# Patient Record
Sex: MALE | Race: WHITE | Employment: FULL TIME | URBAN - METROPOLITAN AREA
[De-identification: names, ages, dates, MRNs, and addresses within clinical notes are randomized per-mention and may not be internally consistent; named-entity substitution may affect disease eponyms.]

---

## 2017-01-19 ENCOUNTER — OFFICE VISIT (OUTPATIENT)
Dept: INTERNAL MEDICINE CLINIC | Facility: CLINIC | Age: 47
End: 2017-01-19

## 2017-01-19 VITALS
RESPIRATION RATE: 18 BRPM | HEIGHT: 73 IN | WEIGHT: 193.38 LBS | SYSTOLIC BLOOD PRESSURE: 134 MMHG | BODY MASS INDEX: 25.63 KG/M2 | HEART RATE: 76 BPM | DIASTOLIC BLOOD PRESSURE: 84 MMHG

## 2017-01-19 DIAGNOSIS — F41.9 ANXIETY: ICD-10-CM

## 2017-01-19 DIAGNOSIS — F17.200 SMOKER: ICD-10-CM

## 2017-01-19 DIAGNOSIS — D75.1 ERYTHROCYTOSIS: ICD-10-CM

## 2017-01-19 DIAGNOSIS — Z00.00 ROUTINE HEALTH MAINTENANCE: ICD-10-CM

## 2017-01-19 DIAGNOSIS — I10 ESSENTIAL HYPERTENSION WITH GOAL BLOOD PRESSURE LESS THAN 140/90: Primary | ICD-10-CM

## 2017-01-19 PROCEDURE — 99212 OFFICE O/P EST SF 10 MIN: CPT | Performed by: INTERNAL MEDICINE

## 2017-01-19 PROCEDURE — 99214 OFFICE O/P EST MOD 30 MIN: CPT | Performed by: INTERNAL MEDICINE

## 2017-01-19 RX ORDER — ALPRAZOLAM 0.5 MG/1
0.5 TABLET ORAL AS NEEDED
Qty: 30 TABLET | Refills: 1 | Status: SHIPPED | OUTPATIENT
Start: 2017-01-19 | End: 2018-01-09

## 2017-01-19 NOTE — PATIENT INSTRUCTIONS
Start Chantix tomorrow. Quit smoking January 30  Continue Chantix. Stop Chantix and call if any signs of sadness or depression. Call if any other side effects.       Cómo conseguir apoyo para dejar de fumar  Dejar de fumar no tiene por qué ser [employer] productos para dejar de fumar, tales tone medicamentos o parches de nicotina, inhaladores, sprays nasales, chicles o tabletas. Pida ayuda  Algunas veces es posible que necesite simplemente hablar con alguien cuando echa de menos el cigarrillo.  Ritta Hang o

## 2017-01-19 NOTE — PROGRESS NOTES
HPI:    Patient ID: Annette Coleman is a 55year old male. HPI Comments: He is feeling well. There was a problem with his refills. He has cut back from a pack per day to a half pack per day. He is trying to quit smoking.     HTN  This is a chronic probl Continue present management. 2. Erythrocytosis  Stable. F/U with oncology. 3. Smoker   Pt would like Chantix. Advised side effect of Chantix. Pt accepts and understands risks. Advised to quit in 1 week.    He will follow-up 1-2 weeks after quittin

## 2017-02-02 ENCOUNTER — LAB ENCOUNTER (OUTPATIENT)
Dept: LAB | Facility: HOSPITAL | Age: 47
End: 2017-02-02
Attending: INTERNAL MEDICINE
Payer: COMMERCIAL

## 2017-02-02 DIAGNOSIS — Z00.00 ROUTINE HEALTH MAINTENANCE: ICD-10-CM

## 2017-02-02 LAB
ALBUMIN SERPL BCP-MCNC: 4.6 G/DL (ref 3.5–4.8)
ALBUMIN/GLOB SERPL: 1.6 {RATIO} (ref 1–2)
ALP SERPL-CCNC: 66 U/L (ref 32–100)
ALT SERPL-CCNC: 23 U/L (ref 17–63)
ANION GAP SERPL CALC-SCNC: 13 MMOL/L (ref 0–18)
AST SERPL-CCNC: 34 U/L (ref 15–41)
BASOPHILS # BLD: 0.1 K/UL (ref 0–0.2)
BASOPHILS NFR BLD: 1 %
BILIRUB SERPL-MCNC: 1.3 MG/DL (ref 0.3–1.2)
BUN SERPL-MCNC: 11 MG/DL (ref 8–20)
BUN/CREAT SERPL: 10.3 (ref 10–20)
CALCIUM SERPL-MCNC: 10.1 MG/DL (ref 8.5–10.5)
CHLORIDE SERPL-SCNC: 105 MMOL/L (ref 95–110)
CHOLEST SERPL-MCNC: 211 MG/DL (ref 110–200)
CO2 SERPL-SCNC: 26 MMOL/L (ref 22–32)
CREAT SERPL-MCNC: 1.07 MG/DL (ref 0.5–1.5)
EOSINOPHIL # BLD: 0.1 K/UL (ref 0–0.7)
EOSINOPHIL NFR BLD: 1 %
ERYTHROCYTE [DISTWIDTH] IN BLOOD BY AUTOMATED COUNT: 13.2 % (ref 11–15)
GLOBULIN PLAS-MCNC: 2.9 G/DL (ref 2.5–3.7)
GLUCOSE SERPL-MCNC: 88 MG/DL (ref 70–99)
HCT VFR BLD AUTO: 55.3 % (ref 41–52)
HDLC SERPL-MCNC: 59 MG/DL
HGB BLD-MCNC: 18.6 G/DL (ref 13.5–17.5)
LDLC SERPL CALC-MCNC: 138 MG/DL (ref 0–99)
LYMPHOCYTES # BLD: 2.1 K/UL (ref 1–4)
LYMPHOCYTES NFR BLD: 23 %
MCH RBC QN AUTO: 35.1 PG (ref 27–32)
MCHC RBC AUTO-ENTMCNC: 33.7 G/DL (ref 32–37)
MCV RBC AUTO: 104.3 FL (ref 80–100)
MONOCYTES # BLD: 0.8 K/UL (ref 0–1)
MONOCYTES NFR BLD: 8 %
NEUTROPHILS # BLD AUTO: 6.2 K/UL (ref 1.8–7.7)
NEUTROPHILS NFR BLD: 67 %
NONHDLC SERPL-MCNC: 152 MG/DL
OSMOLALITY UR CALC.SUM OF ELEC: 297 MOSM/KG (ref 275–295)
PLATELET # BLD AUTO: 252 K/UL (ref 140–400)
PMV BLD AUTO: 8.4 FL (ref 7.4–10.3)
POTASSIUM SERPL-SCNC: 4.6 MMOL/L (ref 3.3–5.1)
PROT SERPL-MCNC: 7.5 G/DL (ref 5.9–8.4)
PSA SERPL-MCNC: 2.3 NG/ML (ref 0–4)
RBC # BLD AUTO: 5.31 M/UL (ref 4.5–5.9)
SODIUM SERPL-SCNC: 144 MMOL/L (ref 136–144)
TRIGL SERPL-MCNC: 69 MG/DL (ref 1–149)
TSH SERPL-ACNC: 0.94 UIU/ML (ref 0.34–5.6)
WBC # BLD AUTO: 9.3 K/UL (ref 4–11)

## 2017-02-02 PROCEDURE — 36415 COLL VENOUS BLD VENIPUNCTURE: CPT

## 2017-02-02 PROCEDURE — 80061 LIPID PANEL: CPT

## 2017-02-02 PROCEDURE — 84443 ASSAY THYROID STIM HORMONE: CPT

## 2017-02-02 PROCEDURE — 85025 COMPLETE CBC W/AUTO DIFF WBC: CPT

## 2017-02-02 PROCEDURE — 80053 COMPREHEN METABOLIC PANEL: CPT

## 2017-02-07 ENCOUNTER — TELEPHONE (OUTPATIENT)
Dept: INTERNAL MEDICINE CLINIC | Facility: CLINIC | Age: 47
End: 2017-02-07

## 2017-02-07 NOTE — TELEPHONE ENCOUNTER
Pt calling checking status of health screening forms to be filled out and faxed to [phone] [facility]. For pts employer. Pt states fill out all information possible but all does not have to be filled in.  Pt would also like copy of form faxed to

## 2017-02-08 NOTE — TELEPHONE ENCOUNTER
Spoke to patient and he believes he faxed it to the [address] [place] [address] location. Advised patient I will check for his form on this Saturday. Patient voiced his understanding  of this.

## 2017-02-08 NOTE — TELEPHONE ENCOUNTER
Form completed and faxed to Yina Forman. Fax confirmation received Copy faxed to patient's company and fax confirmation received.

## 2017-03-01 ENCOUNTER — TELEPHONE (OUTPATIENT)
Dept: INTERNAL MEDICINE CLINIC | Facility: CLINIC | Age: 47
End: 2017-03-01

## 2017-03-01 DIAGNOSIS — I10 ESSENTIAL HYPERTENSION WITH GOAL BLOOD PRESSURE LESS THAN 140/90: Primary | ICD-10-CM

## 2017-03-04 RX ORDER — LOSARTAN POTASSIUM 50 MG/1
50 TABLET ORAL DAILY
Qty: 90 TABLET | Refills: 0 | Status: SHIPPED | OUTPATIENT
Start: 2017-03-04 | End: 2017-06-01

## 2017-03-04 NOTE — TELEPHONE ENCOUNTER
Hypertensive Medications: Refilled per protocol    Protocol Criteria:  · Appointment scheduled in the past 6 months or in the next 3 months  · BMP or CMP in the past 12 months  · Creatinine result < 2  Recent Visits       Provider Department Primary Dx

## 2017-06-01 ENCOUNTER — TELEPHONE (OUTPATIENT)
Dept: INTERNAL MEDICINE CLINIC | Facility: CLINIC | Age: 47
End: 2017-06-01

## 2017-06-01 DIAGNOSIS — I10 ESSENTIAL HYPERTENSION WITH GOAL BLOOD PRESSURE LESS THAN 140/90: Primary | ICD-10-CM

## 2017-06-01 RX ORDER — LOSARTAN POTASSIUM 50 MG/1
50 TABLET ORAL DAILY
Qty: 90 TABLET | Refills: 0 | Status: SHIPPED | OUTPATIENT
Start: 2017-06-01 | End: 2017-08-24

## 2017-06-01 NOTE — TELEPHONE ENCOUNTER
Pt called in requesting a refill on his Losartan Potassium, please. Pt states he took his last tablet today. Current outpatient prescriptions:   •  Losartan Potassium 50 MG Oral Tab, Take 1 tablet (50 mg total) by mouth daily. , Disp: 90 tablet, Rfl: 0

## 2017-06-02 NOTE — TELEPHONE ENCOUNTER
Spoke to pt to inform rx was approved and sent to pharmacy. Per pt already picked up prescription and is back on medication. No further requests or questions at this time.

## 2017-08-24 DIAGNOSIS — I10 ESSENTIAL HYPERTENSION WITH GOAL BLOOD PRESSURE LESS THAN 140/90: ICD-10-CM

## 2017-08-27 RX ORDER — LOSARTAN POTASSIUM 50 MG/1
TABLET ORAL
Qty: 30 TABLET | Refills: 0 | Status: SHIPPED | OUTPATIENT
Start: 2017-08-27 | End: 2017-09-24

## 2017-09-24 DIAGNOSIS — I10 ESSENTIAL HYPERTENSION WITH GOAL BLOOD PRESSURE LESS THAN 140/90: ICD-10-CM

## 2017-09-28 RX ORDER — LOSARTAN POTASSIUM 50 MG/1
TABLET ORAL
Qty: 30 TABLET | Refills: 0 | Status: SHIPPED | OUTPATIENT
Start: 2017-09-28 | End: 2017-10-25

## 2017-10-25 DIAGNOSIS — I10 ESSENTIAL HYPERTENSION WITH GOAL BLOOD PRESSURE LESS THAN 140/90: ICD-10-CM

## 2017-10-26 RX ORDER — LOSARTAN POTASSIUM 50 MG/1
TABLET ORAL
Qty: 30 TABLET | Refills: 0 | Status: SHIPPED | OUTPATIENT
Start: 2017-10-26 | End: 2017-11-25

## 2017-10-26 NOTE — TELEPHONE ENCOUNTER
Please advise on refill request.   Hypertensive Medications  Protocol Criteria:  · Appointment scheduled in the past 6 months or in the next 3 months  · BMP or CMP in the past 12 months  · Creatinine result < 2  Recent Outpatient Visits            9 months

## 2017-11-25 DIAGNOSIS — I10 ESSENTIAL HYPERTENSION WITH GOAL BLOOD PRESSURE LESS THAN 140/90: ICD-10-CM

## 2017-11-25 RX ORDER — LOSARTAN POTASSIUM 50 MG/1
TABLET ORAL
Qty: 30 TABLET | Refills: 0 | Status: SHIPPED | OUTPATIENT
Start: 2017-11-25 | End: 2017-12-23

## 2017-12-23 DIAGNOSIS — I10 ESSENTIAL HYPERTENSION WITH GOAL BLOOD PRESSURE LESS THAN 140/90: ICD-10-CM

## 2017-12-26 RX ORDER — LOSARTAN POTASSIUM 50 MG/1
TABLET ORAL
Qty: 30 TABLET | Refills: 0 | Status: SHIPPED | OUTPATIENT
Start: 2017-12-26 | End: 2018-01-09

## 2017-12-26 NOTE — TELEPHONE ENCOUNTER
Please advise on refill request.    Please reply to pool: SARAH Verdugo please schedule patient with Dr Mina Mason    Hypertensive Medications  Protocol Criteria:  · Appointment scheduled in the past 6 months or in the next 3 months  · BMP or CMP in the past

## 2017-12-26 NOTE — TELEPHONE ENCOUNTER
Pt has 4 pills left and is scheduled to come and see DR on 1/9. pls advise on refill. Thank you      Current Outpatient Prescriptions:  Losartan Potassium 50 MG Oral Tab PLEASE SCHEDULE AN APPOINTMENT. 1 daily.  Disp: 30 tablet Rfl: 0

## 2018-01-09 ENCOUNTER — LAB ENCOUNTER (OUTPATIENT)
Dept: LAB | Facility: HOSPITAL | Age: 48
End: 2018-01-09
Attending: INTERNAL MEDICINE
Payer: COMMERCIAL

## 2018-01-09 ENCOUNTER — OFFICE VISIT (OUTPATIENT)
Dept: INTERNAL MEDICINE CLINIC | Facility: CLINIC | Age: 48
End: 2018-01-09

## 2018-01-09 VITALS
HEIGHT: 73 IN | SYSTOLIC BLOOD PRESSURE: 131 MMHG | WEIGHT: 202 LBS | HEART RATE: 80 BPM | BODY MASS INDEX: 26.77 KG/M2 | DIASTOLIC BLOOD PRESSURE: 85 MMHG

## 2018-01-09 DIAGNOSIS — Z00.00 ROUTINE HEALTH MAINTENANCE: ICD-10-CM

## 2018-01-09 DIAGNOSIS — I10 ESSENTIAL HYPERTENSION WITH GOAL BLOOD PRESSURE LESS THAN 140/90: Primary | ICD-10-CM

## 2018-01-09 DIAGNOSIS — F41.9 ANXIETY: ICD-10-CM

## 2018-01-09 DIAGNOSIS — F17.200 TOBACCO USE DISORDER: ICD-10-CM

## 2018-01-09 LAB
ALBUMIN SERPL BCP-MCNC: 4.5 G/DL (ref 3.5–4.8)
ALBUMIN/GLOB SERPL: 1.7 {RATIO} (ref 1–2)
ALP SERPL-CCNC: 61 U/L (ref 32–100)
ALT SERPL-CCNC: 21 U/L (ref 17–63)
ANION GAP SERPL CALC-SCNC: 9 MMOL/L (ref 0–18)
AST SERPL-CCNC: 27 U/L (ref 15–41)
BASOPHILS # BLD: 0.1 K/UL (ref 0–0.2)
BASOPHILS NFR BLD: 1 %
BILIRUB SERPL-MCNC: 1.4 MG/DL (ref 0.3–1.2)
BUN SERPL-MCNC: 14 MG/DL (ref 8–20)
BUN/CREAT SERPL: 14.3 (ref 10–20)
CALCIUM SERPL-MCNC: 9.7 MG/DL (ref 8.5–10.5)
CHLORIDE SERPL-SCNC: 102 MMOL/L (ref 95–110)
CHOLEST SERPL-MCNC: 214 MG/DL (ref 110–200)
CO2 SERPL-SCNC: 28 MMOL/L (ref 22–32)
CREAT SERPL-MCNC: 0.98 MG/DL (ref 0.5–1.5)
EOSINOPHIL # BLD: 0.2 K/UL (ref 0–0.7)
EOSINOPHIL NFR BLD: 3 %
ERYTHROCYTE [DISTWIDTH] IN BLOOD BY AUTOMATED COUNT: 12.8 % (ref 11–15)
GLOBULIN PLAS-MCNC: 2.7 G/DL (ref 2.5–3.7)
GLUCOSE SERPL-MCNC: 101 MG/DL (ref 70–99)
HCT VFR BLD AUTO: 49.1 % (ref 41–52)
HDLC SERPL-MCNC: 52 MG/DL
HGB BLD-MCNC: 16.6 G/DL (ref 13.5–17.5)
LDLC SERPL CALC-MCNC: 132 MG/DL (ref 0–99)
LYMPHOCYTES # BLD: 2.4 K/UL (ref 1–4)
LYMPHOCYTES NFR BLD: 29 %
MCH RBC QN AUTO: 33.9 PG (ref 27–32)
MCHC RBC AUTO-ENTMCNC: 33.8 G/DL (ref 32–37)
MCV RBC AUTO: 100.3 FL (ref 80–100)
MONOCYTES # BLD: 0.9 K/UL (ref 0–1)
MONOCYTES NFR BLD: 11 %
NEUTROPHILS # BLD AUTO: 4.6 K/UL (ref 1.8–7.7)
NEUTROPHILS NFR BLD: 57 %
NONHDLC SERPL-MCNC: 162 MG/DL
OSMOLALITY UR CALC.SUM OF ELEC: 289 MOSM/KG (ref 275–295)
PLATELET # BLD AUTO: 269 K/UL (ref 140–400)
PMV BLD AUTO: 7.5 FL (ref 7.4–10.3)
POTASSIUM SERPL-SCNC: 4.2 MMOL/L (ref 3.3–5.1)
PROT SERPL-MCNC: 7.2 G/DL (ref 5.9–8.4)
PSA SERPL-MCNC: 2.5 NG/ML (ref 0–4)
RBC # BLD AUTO: 4.89 M/UL (ref 4.5–5.9)
SODIUM SERPL-SCNC: 139 MMOL/L (ref 136–144)
TRIGL SERPL-MCNC: 149 MG/DL (ref 1–149)
TSH SERPL-ACNC: 1.17 UIU/ML (ref 0.45–5.33)
WBC # BLD AUTO: 8.1 K/UL (ref 4–11)

## 2018-01-09 PROCEDURE — 80061 LIPID PANEL: CPT

## 2018-01-09 PROCEDURE — 99212 OFFICE O/P EST SF 10 MIN: CPT | Performed by: INTERNAL MEDICINE

## 2018-01-09 PROCEDURE — 84443 ASSAY THYROID STIM HORMONE: CPT

## 2018-01-09 PROCEDURE — 36415 COLL VENOUS BLD VENIPUNCTURE: CPT

## 2018-01-09 PROCEDURE — 99214 OFFICE O/P EST MOD 30 MIN: CPT | Performed by: INTERNAL MEDICINE

## 2018-01-09 PROCEDURE — 99406 BEHAV CHNG SMOKING 3-10 MIN: CPT | Performed by: INTERNAL MEDICINE

## 2018-01-09 PROCEDURE — 80053 COMPREHEN METABOLIC PANEL: CPT

## 2018-01-09 PROCEDURE — 85025 COMPLETE CBC W/AUTO DIFF WBC: CPT

## 2018-01-09 RX ORDER — ALPRAZOLAM 0.5 MG/1
0.5 TABLET ORAL AS NEEDED
Qty: 30 TABLET | Refills: 1 | Status: SHIPPED | OUTPATIENT
Start: 2018-01-09 | End: 2018-10-03

## 2018-01-09 RX ORDER — LOSARTAN POTASSIUM 50 MG/1
50 TABLET ORAL
Qty: 30 TABLET | Refills: 5 | Status: SHIPPED | OUTPATIENT
Start: 2018-01-09 | End: 2018-07-21

## 2018-01-09 NOTE — ASSESSMENT & PLAN NOTE
He was afraid to start the Chantix and he wasn't quite ready to quit. He will start it now. S/e discussed. No history of depression. F/u 2 months.

## 2018-01-09 NOTE — PROGRESS NOTES
HPI:    Patient ID: Ernesto Hernandez is a 52year old male. Pt needs refills on his meds. He still gets anxious approx once a week at work. He works at [employer]. HTN   This is a chronic problem. The current episode started more than 1 year ago.  The Known Allergies     Smoking status: Current Every Day Smoker                                                   Packs/day: 1.00      Years: 25.00        Types: Cigarettes  Smokeless tobacco: Current User                    Alcohol use: Yes           0.0 oz/ Relevant Orders    COMP METABOLIC PANEL (14)    TSH W REFLEX TO FREE T4    CBC WITH DIFFERENTIAL WITH PLATELET    LIPID PANEL    PSA SCREEN              Meds This Visit:  Signed Prescriptions Disp Refills    Losartan Potassium 50 MG Oral Tab 30 table

## 2018-01-23 DIAGNOSIS — I10 ESSENTIAL HYPERTENSION WITH GOAL BLOOD PRESSURE LESS THAN 140/90: ICD-10-CM

## 2018-01-23 RX ORDER — LOSARTAN POTASSIUM 50 MG/1
TABLET ORAL
Qty: 30 TABLET | Refills: 0 | OUTPATIENT
Start: 2018-01-23

## 2018-02-26 NOTE — PATIENT INSTRUCTIONS
Start Chantix Thursday. Quit smoking January 29. Continue Chantix. Stop Chantix and call if any signs of sadness or depression. Call if any other side effects.         Getting Support for Quitting Smoking  You don’t have to go through the process of aimee after you quit. Ask a friend to call you each day to see how you’re doing. Telephone counseling can also help you keep on track. Ask your healthcare provider, local hospital, or public health department to put you in touch with a phone counselor.  You may a Breathing spontaneous and unlabored. Breath sounds clear and equal bilaterally with regular rhythm.

## 2018-07-21 DIAGNOSIS — I10 ESSENTIAL HYPERTENSION WITH GOAL BLOOD PRESSURE LESS THAN 140/90: ICD-10-CM

## 2018-07-21 RX ORDER — LOSARTAN POTASSIUM 50 MG/1
TABLET ORAL
Qty: 30 TABLET | Refills: 0 | Status: SHIPPED | OUTPATIENT
Start: 2018-07-21 | End: 2018-08-27

## 2018-08-27 DIAGNOSIS — I10 ESSENTIAL HYPERTENSION WITH GOAL BLOOD PRESSURE LESS THAN 140/90: ICD-10-CM

## 2018-08-27 RX ORDER — LOSARTAN POTASSIUM 50 MG/1
TABLET ORAL
Qty: 30 TABLET | Refills: 0 | Status: SHIPPED | OUTPATIENT
Start: 2018-08-27 | End: 2018-09-25

## 2018-09-25 DIAGNOSIS — I10 ESSENTIAL HYPERTENSION WITH GOAL BLOOD PRESSURE LESS THAN 140/90: ICD-10-CM

## 2018-09-25 RX ORDER — LOSARTAN POTASSIUM 50 MG/1
TABLET ORAL
Qty: 30 TABLET | Refills: 0 | Status: SHIPPED | OUTPATIENT
Start: 2018-09-25 | End: 2018-10-22

## 2018-10-03 DIAGNOSIS — F41.9 ANXIETY: ICD-10-CM

## 2018-10-05 RX ORDER — ALPRAZOLAM 0.5 MG/1
TABLET ORAL
Qty: 30 TABLET | Refills: 2 | OUTPATIENT
Start: 2018-10-05 | End: 2019-03-26

## 2018-10-05 NOTE — TELEPHONE ENCOUNTER
NO PROTOCOL<medication pended for approval.    Requested Prescriptions     Pending Prescriptions Disp Refills   • ALPRAZOLAM 0.5 MG Oral Tab [Pharmacy Med Name: ALPRAZOLAM 0.5MG TABLETS] 30 tablet 0     Sig: TAKE 1 TABLET BY MOUTH AS NEEDED.        Last Off

## 2018-10-22 DIAGNOSIS — I10 ESSENTIAL HYPERTENSION WITH GOAL BLOOD PRESSURE LESS THAN 140/90: ICD-10-CM

## 2018-10-23 NOTE — TELEPHONE ENCOUNTER
Failed per nursing protocol - please advise on refill request   Hypertensive Medications  Protocol Criteria:  · Appointment scheduled in the past 6 months or in the next 3 months  · BMP or CMP in the past 12 months  · Creatinine result < 2  Recent Outpati

## 2018-10-24 RX ORDER — LOSARTAN POTASSIUM 50 MG/1
TABLET ORAL
Qty: 30 TABLET | Refills: 0 | Status: SHIPPED | OUTPATIENT
Start: 2018-10-24 | End: 2018-11-20

## 2018-11-20 DIAGNOSIS — I10 ESSENTIAL HYPERTENSION WITH GOAL BLOOD PRESSURE LESS THAN 140/90: ICD-10-CM

## 2018-11-21 RX ORDER — LOSARTAN POTASSIUM 50 MG/1
TABLET ORAL
Qty: 30 TABLET | Refills: 0 | Status: SHIPPED | OUTPATIENT
Start: 2018-11-21 | End: 2018-12-17

## 2018-11-21 NOTE — TELEPHONE ENCOUNTER
Please review; protocol failed.   Hypertensive Medications  Protocol Criteria:  · Appointment scheduled in the past 6 months or in the next 3 months  · BMP or CMP in the past 12 months  · Creatinine result < 2  Recent Outpatient Visits            10 months

## 2018-12-17 DIAGNOSIS — I10 ESSENTIAL HYPERTENSION WITH GOAL BLOOD PRESSURE LESS THAN 140/90: ICD-10-CM

## 2018-12-18 NOTE — TELEPHONE ENCOUNTER
CSS, please contact pt and assist in scheduling overdue f/u. Once scheduled or after 3 attempts please route back for refill review.     Hypertensive Medications  Protocol Criteria:  · Appointment scheduled in the past 6 months or in the next 3 months  · BM

## 2018-12-20 RX ORDER — LOSARTAN POTASSIUM 50 MG/1
TABLET ORAL
Qty: 30 TABLET | Refills: 0 | Status: SHIPPED | OUTPATIENT
Start: 2018-12-20 | End: 2019-01-15

## 2018-12-20 NOTE — TELEPHONE ENCOUNTER
DR DE LA ROSA- please advise on refill. Failed protocol due to needing appt. But unable to reach pt to schedule.

## 2019-01-05 ENCOUNTER — TELEPHONE (OUTPATIENT)
Dept: INTERNAL MEDICINE CLINIC | Facility: CLINIC | Age: 49
End: 2019-01-05

## 2019-01-05 NOTE — TELEPHONE ENCOUNTER
Patient calling to find out his blood type. Chart reviewed, no typing at [facility] record. Patient is planning on donating blood this month and would like to know his blood type.  Reassured patient that he will not need to know his blood type before d

## 2019-01-15 DIAGNOSIS — I10 ESSENTIAL HYPERTENSION WITH GOAL BLOOD PRESSURE LESS THAN 140/90: ICD-10-CM

## 2019-01-15 RX ORDER — LOSARTAN POTASSIUM 50 MG/1
TABLET ORAL
Qty: 30 TABLET | Refills: 0 | Status: SHIPPED | OUTPATIENT
Start: 2019-01-15 | End: 2019-02-10

## 2019-01-15 NOTE — TELEPHONE ENCOUNTER
Please review; protocol failed.     Hypertensive Medications  Protocol Criteria:  · Appointment scheduled in the past 6 months or in the next 3 months  · BMP or CMP in the past 12 months  · Creatinine result < 2  Recent Outpatient Visits            1 year a

## 2019-02-10 DIAGNOSIS — I10 ESSENTIAL HYPERTENSION WITH GOAL BLOOD PRESSURE LESS THAN 140/90: ICD-10-CM

## 2019-02-11 RX ORDER — LOSARTAN POTASSIUM 50 MG/1
TABLET ORAL
Qty: 15 TABLET | Refills: 0 | Status: SHIPPED | OUTPATIENT
Start: 2019-02-11 | End: 2019-03-03

## 2019-03-03 DIAGNOSIS — I10 ESSENTIAL HYPERTENSION WITH GOAL BLOOD PRESSURE LESS THAN 140/90: ICD-10-CM

## 2019-03-03 RX ORDER — LOSARTAN POTASSIUM 50 MG/1
TABLET ORAL
Qty: 15 TABLET | Refills: 0 | Status: SHIPPED | OUTPATIENT
Start: 2019-03-03 | End: 2019-03-27

## 2019-03-26 DIAGNOSIS — F41.9 ANXIETY: ICD-10-CM

## 2019-03-27 DIAGNOSIS — I10 ESSENTIAL HYPERTENSION WITH GOAL BLOOD PRESSURE LESS THAN 140/90: ICD-10-CM

## 2019-03-27 RX ORDER — LOSARTAN POTASSIUM 50 MG/1
TABLET ORAL
Qty: 15 TABLET | Refills: 0 | Status: SHIPPED | OUTPATIENT
Start: 2019-03-27 | End: 2019-04-27

## 2019-03-27 RX ORDER — ALPRAZOLAM 0.5 MG/1
TABLET ORAL
Qty: 15 TABLET | Refills: 0 | OUTPATIENT
Start: 2019-03-27 | End: 2019-04-27

## 2019-03-27 NOTE — TELEPHONE ENCOUNTER
To reception staff, pls call pt for appt. No future appointments. No Protocol on this med. Controlled medication pending for review. If approved needs to be called in or faxed by on-site staff.       Requested Prescriptions     Pending Prescri

## 2019-03-28 NOTE — TELEPHONE ENCOUNTER
Appt request sent via [employer] please f/u, thanks    Please review; protocol failed.   Hypertensive Medications  Protocol Criteria:  · Appointment scheduled in the past 6 months or in the next 3 months  · BMP or CMP in the past 12 months  · Creatinine r

## 2019-04-09 DIAGNOSIS — I10 ESSENTIAL HYPERTENSION WITH GOAL BLOOD PRESSURE LESS THAN 140/90: ICD-10-CM

## 2019-04-09 RX ORDER — LOSARTAN POTASSIUM 50 MG/1
TABLET ORAL
Qty: 15 TABLET | Refills: 0 | OUTPATIENT
Start: 2019-04-09

## 2019-04-09 NOTE — TELEPHONE ENCOUNTER
Please call pharmacy that patient needs to be seen no refills, additionally I never seen this patient I was Dr. on call who approved 2 weeks prescription, he can see Dr. Gala Rausch or Dr. Portillo Foerman he has seen them in the past.  No foreign response letter was mailed to patient already

## 2019-04-12 DIAGNOSIS — I10 ESSENTIAL HYPERTENSION WITH GOAL BLOOD PRESSURE LESS THAN 140/90: ICD-10-CM

## 2019-04-14 RX ORDER — LOSARTAN POTASSIUM 50 MG/1
TABLET ORAL
Qty: 15 TABLET | Refills: 0 | Status: SHIPPED | OUTPATIENT
Start: 2019-04-14 | End: 2019-04-27 | Stop reason: CLARIF

## 2019-04-14 NOTE — TELEPHONE ENCOUNTER
Please advise on 3/28/19 partial refill given needs appt. No recent visit or labs. Phone room please call patient and schedule a follow up.    Hypertensive Medications  Protocol Criteria:  · Appointment scheduled in the past 6 months or in the next 3 mon

## 2019-04-27 ENCOUNTER — LAB ENCOUNTER (OUTPATIENT)
Dept: LAB | Age: 49
End: 2019-04-27
Attending: INTERNAL MEDICINE
Payer: COMMERCIAL

## 2019-04-27 ENCOUNTER — OFFICE VISIT (OUTPATIENT)
Dept: INTERNAL MEDICINE CLINIC | Facility: CLINIC | Age: 49
End: 2019-04-27
Payer: COMMERCIAL

## 2019-04-27 VITALS
DIASTOLIC BLOOD PRESSURE: 88 MMHG | BODY MASS INDEX: 28.14 KG/M2 | SYSTOLIC BLOOD PRESSURE: 144 MMHG | HEART RATE: 74 BPM | HEIGHT: 73 IN | WEIGHT: 212.31 LBS

## 2019-04-27 DIAGNOSIS — I10 ESSENTIAL HYPERTENSION: Primary | ICD-10-CM

## 2019-04-27 DIAGNOSIS — F41.9 ANXIETY: ICD-10-CM

## 2019-04-27 DIAGNOSIS — Z00.00 ROUTINE HEALTH MAINTENANCE: ICD-10-CM

## 2019-04-27 DIAGNOSIS — F17.200 TOBACCO USE DISORDER: ICD-10-CM

## 2019-04-27 PROCEDURE — 80061 LIPID PANEL: CPT

## 2019-04-27 PROCEDURE — G0463 HOSPITAL OUTPT CLINIC VISIT: HCPCS | Performed by: INTERNAL MEDICINE

## 2019-04-27 PROCEDURE — 80053 COMPREHEN METABOLIC PANEL: CPT

## 2019-04-27 PROCEDURE — 85025 COMPLETE CBC W/AUTO DIFF WBC: CPT

## 2019-04-27 PROCEDURE — 99214 OFFICE O/P EST MOD 30 MIN: CPT | Performed by: INTERNAL MEDICINE

## 2019-04-27 PROCEDURE — 84443 ASSAY THYROID STIM HORMONE: CPT

## 2019-04-27 PROCEDURE — 36415 COLL VENOUS BLD VENIPUNCTURE: CPT

## 2019-04-27 RX ORDER — LOSARTAN POTASSIUM 100 MG/1
TABLET ORAL
Qty: 90 TABLET | Refills: 1 | Status: SHIPPED | OUTPATIENT
Start: 2019-04-27 | End: 2019-10-15

## 2019-04-27 RX ORDER — ALPRAZOLAM 0.5 MG/1
TABLET ORAL
Qty: 15 TABLET | Refills: 0 | COMMUNITY
Start: 2019-04-27 | End: 2019-04-27

## 2019-04-27 RX ORDER — ALPRAZOLAM 0.5 MG/1
TABLET ORAL
Qty: 30 TABLET | Refills: 0 | Status: SHIPPED | OUTPATIENT
Start: 2019-04-27 | End: 2020-05-06

## 2019-04-27 NOTE — ASSESSMENT & PLAN NOTE
The patient's blood pressure is not optimally controlled. I will increase his losartan from 50 mg daily to 100 mg daily. Follow-up in 6 months.

## 2019-04-27 NOTE — PROGRESS NOTES
HPI:    Patient ID: Gualberto Vizcaino is a 50year old male. Pt has a form for work. He needs blood tests. He smokes a pack per day and doesn't think he can quit. He needs refills on his blood pressure medication.   He does try to eat well and he exer Disp:  Rfl:        Allergies:No Known Allergies     Social History    Tobacco Use      Smoking status: Current Every Day Smoker        Packs/day: 1.00        Years: 25.00        Pack years: 25        Types: Cigarettes      Smokeless tobacco: Current User health maintenance     The patient will do the blood test today and I will fill out the forms when I get the results.          Relevant Orders    CBC WITH DIFFERENTIAL WITH PLATELET    TSH W REFLEX TO FREE T4    COMP METABOLIC PANEL (14)    LIPID PANEL    P

## 2019-04-27 NOTE — ASSESSMENT & PLAN NOTE
Discussed with pt. Counseled to quit. Advised the health risks of smoking, including emphesema and increased the risk of all cancers. Patient is unable to set a quit date at this time, since he is not ready to quit.

## 2019-04-29 ENCOUNTER — MED REC SCAN ONLY (OUTPATIENT)
Dept: INTERNAL MEDICINE CLINIC | Facility: CLINIC | Age: 49
End: 2019-04-29

## 2019-10-15 DIAGNOSIS — I10 ESSENTIAL HYPERTENSION: ICD-10-CM

## 2019-10-15 RX ORDER — LOSARTAN POTASSIUM 100 MG/1
TABLET ORAL
Qty: 90 TABLET | Refills: 1 | Status: SHIPPED | OUTPATIENT
Start: 2019-10-15 | End: 2020-05-06

## 2019-10-16 NOTE — TELEPHONE ENCOUNTER
Refill passed per [facility] protocol.   Hypertensive Medications  Protocol Criteria:  · Appointment scheduled in the past 6 months or in the next 3 months  · BMP or CMP in the past 12 months  · Creatinine result < 2  Recent Outpatient Visits

## 2020-05-01 DIAGNOSIS — I10 ESSENTIAL HYPERTENSION: ICD-10-CM

## 2020-05-01 NOTE — TELEPHONE ENCOUNTER
Pt is due for a routine appointment. Please ask if he would schedule a video appointment. Ask pt to download the [place] carlos on their phone. I have appointments available for this tomorrow, Saturday May nd, and Monday through Thursday every day.

## 2020-05-06 ENCOUNTER — TELEMEDICINE (OUTPATIENT)
Dept: INTERNAL MEDICINE CLINIC | Facility: CLINIC | Age: 50
End: 2020-05-06
Payer: COMMERCIAL

## 2020-05-06 DIAGNOSIS — D75.1 ERYTHROCYTOSIS: ICD-10-CM

## 2020-05-06 DIAGNOSIS — Z00.00 ROUTINE HEALTH MAINTENANCE: ICD-10-CM

## 2020-05-06 DIAGNOSIS — F17.200 TOBACCO USE DISORDER: ICD-10-CM

## 2020-05-06 DIAGNOSIS — I10 ESSENTIAL HYPERTENSION: Primary | ICD-10-CM

## 2020-05-06 PROCEDURE — 99214 OFFICE O/P EST MOD 30 MIN: CPT | Performed by: INTERNAL MEDICINE

## 2020-05-06 RX ORDER — LOSARTAN POTASSIUM 100 MG/1
TABLET ORAL
Qty: 90 TABLET | Refills: 1 | Status: SHIPPED | OUTPATIENT
Start: 2020-05-06 | End: 2020-10-24

## 2020-05-06 NOTE — ASSESSMENT & PLAN NOTE
Pt does not check his blood pressure at home. Advised patient of the need to check blood tests for kidney function and potassium level. Advised him to schedule a f/u appointment in 4 months.

## 2020-05-06 NOTE — PROGRESS NOTES
Video Progress Note  Telehealth Verbal Consent   I conducted a telehealth visit with Crispinmary Kem today, 05/06/20, which was completed using two-way, real-time interactive audio and video communication.  This has been done in good saad to provide contin no chest pain, palpitations or shortness of breath. There are no associated agents to hypertension. Risk factors for coronary artery disease include male gender and smoking/tobacco exposure. Past treatments include angiotensin blockers.  The current treatme Normocephalic and atraumatic. Eyes: EOM are normal.   Pulmonary/Chest: Effort normal. No respiratory distress. Neurological: He is alert and oriented to person, place, and time. Psychiatric: He has a normal mood and affect.  Thought content normal.

## 2020-05-06 NOTE — PATIENT INSTRUCTIONS
Do fasting labs soon when the stay at home order is over. Fast for 12 hours. Water is okay. Walk in. Schedule a follow-up appointment in September.

## 2020-05-07 RX ORDER — LOSARTAN POTASSIUM 100 MG/1
TABLET ORAL
Qty: 90 TABLET | Refills: 1 | Status: SHIPPED | OUTPATIENT
Start: 2020-05-07 | End: 2021-08-07

## 2020-10-23 DIAGNOSIS — I10 ESSENTIAL HYPERTENSION: ICD-10-CM

## 2020-10-24 RX ORDER — LOSARTAN POTASSIUM 100 MG/1
TABLET ORAL
Qty: 90 TABLET | Refills: 0 | Status: SHIPPED | OUTPATIENT
Start: 2020-10-24 | End: 2021-07-27

## 2021-04-12 DIAGNOSIS — Z23 NEED FOR VACCINATION: ICD-10-CM

## 2021-04-13 ENCOUNTER — IMMUNIZATION (OUTPATIENT)
Dept: LAB | Facility: HOSPITAL | Age: 51
End: 2021-04-13
Attending: HOSPITALIST
Payer: COMMERCIAL

## 2021-04-13 DIAGNOSIS — Z23 NEED FOR VACCINATION: Primary | ICD-10-CM

## 2021-04-13 PROCEDURE — 0011A SARSCOV2 VAC 100MCG/0.5ML IM: CPT

## 2021-05-16 ENCOUNTER — IMMUNIZATION (OUTPATIENT)
Dept: LAB | Facility: HOSPITAL | Age: 51
End: 2021-05-16
Attending: EMERGENCY MEDICINE
Payer: COMMERCIAL

## 2021-05-16 DIAGNOSIS — Z23 NEED FOR VACCINATION: Primary | ICD-10-CM

## 2021-05-16 PROCEDURE — 0012A SARSCOV2 VAC 100MCG/0.5ML IM: CPT

## 2021-07-12 DIAGNOSIS — I10 ESSENTIAL HYPERTENSION: ICD-10-CM

## 2021-07-13 RX ORDER — LOSARTAN POTASSIUM 100 MG/1
TABLET ORAL
Qty: 90 TABLET | Refills: 0 | OUTPATIENT
Start: 2021-07-13

## 2021-07-27 ENCOUNTER — TELEPHONE (OUTPATIENT)
Dept: INTERNAL MEDICINE CLINIC | Facility: CLINIC | Age: 51
End: 2021-07-27

## 2021-07-27 RX ORDER — LOSARTAN POTASSIUM 100 MG/1
TABLET ORAL
Qty: 90 TABLET | Refills: 0 | Status: SHIPPED | OUTPATIENT
Start: 2021-07-27 | End: 2021-08-07

## 2021-07-27 NOTE — TELEPHONE ENCOUNTER
1st attempt/not able to leave a message voice mail box if full. When the patient returns the call please see message below.

## 2021-07-27 NOTE — TELEPHONE ENCOUNTER
I see that the patient has scheduled with Mccarthy Balloon. I prefer to see him myself. Please see if he can come this Saturday, July 31 to [facility] in a reserve 24 slot.

## 2021-07-29 NOTE — TELEPHONE ENCOUNTER
Talked to patient mom and she will relay message to patient due to patient voice mail of patient is still full.

## 2021-07-29 NOTE — TELEPHONE ENCOUNTER
No.  Please don't double book him. When I sent the message, I had several slots open. Please book him in the next available appointment with me. I will refill his medications until then.

## 2021-07-29 NOTE — TELEPHONE ENCOUNTER
Dr. Angela Whiteside is it okay if [facility] double books this patient on a RES reserve slot?

## 2021-08-07 ENCOUNTER — TELEPHONE (OUTPATIENT)
Dept: INTERNAL MEDICINE CLINIC | Facility: CLINIC | Age: 51
End: 2021-08-07

## 2021-08-07 ENCOUNTER — OFFICE VISIT (OUTPATIENT)
Dept: INTERNAL MEDICINE CLINIC | Facility: CLINIC | Age: 51
End: 2021-08-07
Payer: COMMERCIAL

## 2021-08-07 ENCOUNTER — LAB ENCOUNTER (OUTPATIENT)
Dept: LAB | Facility: HOSPITAL | Age: 51
End: 2021-08-07
Attending: NURSE PRACTITIONER
Payer: COMMERCIAL

## 2021-08-07 DIAGNOSIS — F17.200 TOBACCO USE DISORDER: ICD-10-CM

## 2021-08-07 DIAGNOSIS — I10 ESSENTIAL HYPERTENSION: ICD-10-CM

## 2021-08-07 DIAGNOSIS — I10 ESSENTIAL HYPERTENSION: Primary | ICD-10-CM

## 2021-08-07 LAB
ALBUMIN SERPL-MCNC: 3.8 G/DL (ref 3.4–5)
ALBUMIN/GLOB SERPL: 1.1 {RATIO} (ref 1–2)
ALP LIVER SERPL-CCNC: 61 U/L
ALT SERPL-CCNC: 23 U/L
ANION GAP SERPL CALC-SCNC: 5 MMOL/L (ref 0–18)
AST SERPL-CCNC: 20 U/L (ref 15–37)
BASOPHILS # BLD AUTO: 0.07 X10(3) UL (ref 0–0.2)
BASOPHILS NFR BLD AUTO: 0.9 %
BILIRUB SERPL-MCNC: 0.5 MG/DL (ref 0.1–2)
BUN BLD-MCNC: 11 MG/DL (ref 7–18)
BUN/CREAT SERPL: 12.2 (ref 10–20)
CALCIUM BLD-MCNC: 9 MG/DL (ref 8.5–10.1)
CHLORIDE SERPL-SCNC: 110 MMOL/L (ref 98–112)
CHOLEST SMN-MCNC: 223 MG/DL (ref ?–200)
CO2 SERPL-SCNC: 24 MMOL/L (ref 21–32)
COMPLEXED PSA SERPL-MCNC: 6.49 NG/ML (ref ?–4)
CREAT BLD-MCNC: 0.9 MG/DL
DEPRECATED RDW RBC AUTO: 45.6 FL (ref 35.1–46.3)
EOSINOPHIL # BLD AUTO: 0.27 X10(3) UL (ref 0–0.7)
EOSINOPHIL NFR BLD AUTO: 3.6 %
ERYTHROCYTE [DISTWIDTH] IN BLOOD BY AUTOMATED COUNT: 12.7 % (ref 11–15)
GLOBULIN PLAS-MCNC: 3.5 G/DL (ref 2.8–4.4)
GLUCOSE BLD-MCNC: 85 MG/DL (ref 70–99)
HCT VFR BLD AUTO: 47.7 %
HDLC SERPL-MCNC: 41 MG/DL (ref 40–59)
HGB BLD-MCNC: 16.6 G/DL
IMM GRANULOCYTES # BLD AUTO: 0.02 X10(3) UL (ref 0–1)
IMM GRANULOCYTES NFR BLD: 0.3 %
LDLC SERPL CALC-MCNC: 161 MG/DL (ref ?–100)
LYMPHOCYTES # BLD AUTO: 1.86 X10(3) UL (ref 1–4)
LYMPHOCYTES NFR BLD AUTO: 24.8 %
M PROTEIN MFR SERPL ELPH: 7.3 G/DL (ref 6.4–8.2)
MCH RBC QN AUTO: 33.5 PG (ref 26–34)
MCHC RBC AUTO-ENTMCNC: 34.8 G/DL (ref 31–37)
MCV RBC AUTO: 96.2 FL
MONOCYTES # BLD AUTO: 0.73 X10(3) UL (ref 0.1–1)
MONOCYTES NFR BLD AUTO: 9.7 %
NEUTROPHILS # BLD AUTO: 4.56 X10 (3) UL (ref 1.5–7.7)
NEUTROPHILS # BLD AUTO: 4.56 X10(3) UL (ref 1.5–7.7)
NEUTROPHILS NFR BLD AUTO: 60.7 %
NONHDLC SERPL-MCNC: 182 MG/DL (ref ?–130)
OSMOLALITY SERPL CALC.SUM OF ELEC: 287 MOSM/KG (ref 275–295)
PATIENT FASTING Y/N/NP: YES
PATIENT FASTING Y/N/NP: YES
PLATELET # BLD AUTO: 311 10(3)UL (ref 150–450)
POTASSIUM SERPL-SCNC: 4.5 MMOL/L (ref 3.5–5.1)
RBC # BLD AUTO: 4.96 X10(6)UL
SODIUM SERPL-SCNC: 139 MMOL/L (ref 136–145)
TRIGL SERPL-MCNC: 114 MG/DL (ref 30–149)
TSI SER-ACNC: 1.16 MIU/ML (ref 0.36–3.74)
VIT B12 SERPL-MCNC: 388 PG/ML (ref 193–986)
VIT D+METAB SERPL-MCNC: 25.9 NG/ML (ref 30–100)
VLDLC SERPL CALC-MCNC: 22 MG/DL (ref 0–30)
WBC # BLD AUTO: 7.5 X10(3) UL (ref 4–11)

## 2021-08-07 PROCEDURE — 3079F DIAST BP 80-89 MM HG: CPT | Performed by: NURSE PRACTITIONER

## 2021-08-07 PROCEDURE — 82607 VITAMIN B-12: CPT

## 2021-08-07 PROCEDURE — 36415 COLL VENOUS BLD VENIPUNCTURE: CPT

## 2021-08-07 PROCEDURE — 82306 VITAMIN D 25 HYDROXY: CPT

## 2021-08-07 PROCEDURE — 80061 LIPID PANEL: CPT

## 2021-08-07 PROCEDURE — 99214 OFFICE O/P EST MOD 30 MIN: CPT | Performed by: NURSE PRACTITIONER

## 2021-08-07 PROCEDURE — 85025 COMPLETE CBC W/AUTO DIFF WBC: CPT

## 2021-08-07 PROCEDURE — 84443 ASSAY THYROID STIM HORMONE: CPT

## 2021-08-07 PROCEDURE — 3077F SYST BP >= 140 MM HG: CPT | Performed by: NURSE PRACTITIONER

## 2021-08-07 PROCEDURE — 3008F BODY MASS INDEX DOCD: CPT | Performed by: NURSE PRACTITIONER

## 2021-08-07 PROCEDURE — 80053 COMPREHEN METABOLIC PANEL: CPT

## 2021-08-07 RX ORDER — LOSARTAN POTASSIUM 100 MG/1
TABLET ORAL
Qty: 90 TABLET | Refills: 2 | Status: SHIPPED | OUTPATIENT
Start: 2021-08-07

## 2021-08-07 NOTE — PROGRESS NOTES
HPI:    Patient ID: Luisana Hernández is a 46year old male. Works for [employer]    HPI Follow up on medications. Hx HTN  46year old male who I am meeting for the first time. He has a history of HTN, Tobacco disorder, and anxiety. He was last seen by DR. Faulkner kg)    There is no height or weight on file to calculate BMI. (2)           ASSESSMENT/PLAN:     Problem List Items Addressed This Visit     None         ***    No follow-ups on file. No orders of the defined types were placed in this encounter.       Med

## 2021-08-07 NOTE — TELEPHONE ENCOUNTER
Patient would like a copy of his results mailed to his home address once the results are ready. Confirmed home address in chart.

## 2021-08-08 VITALS
RESPIRATION RATE: 16 BRPM | DIASTOLIC BLOOD PRESSURE: 82 MMHG | HEIGHT: 73 IN | SYSTOLIC BLOOD PRESSURE: 140 MMHG | HEART RATE: 78 BPM | WEIGHT: 202 LBS | BODY MASS INDEX: 26.77 KG/M2

## 2021-08-09 NOTE — ASSESSMENT & PLAN NOTE
46year old who has tobi smokingfor a long time. He smokes 1 pack per day. He was considering Chantix but read all the side effects and is afraid to take any medication. Discussed gradually reducing the number of cigarettes he smokes per day.     Plan  1) W

## 2021-08-09 NOTE — ASSESSMENT & PLAN NOTE
Patient with a history of HTN.and on Losartan. We discussed his need to make an annual appointment with Dr. Amador Siddiqui. He needs annual lab. We discussed fluids, low sodium diet, and compliance with medication. His repeat manual B/P was 140/82. Ricardo Aguilar     Plan  1) R

## 2021-08-09 NOTE — TELEPHONE ENCOUNTER
Routed to [address] [place] [address] Phyllis  for advise, thanks. No future appointments.

## 2021-08-09 NOTE — PROGRESS NOTES
HPI:    Patient ID: Nela Brownlee is a 46year old male. HPI Hypertension   Follow up on medications. Hx HTN and has been on Lorsartan 100mg daily. 46year old male who I am meeting for the first time.  He has a history of HTN, Tobacco disorder, and a Musculoskeletal: Negative for back pain and joint swelling. Skin: Negative for rash. Allergic/Immunologic: Negative for environmental allergies. Neurological: Negative for weakness, numbness and headaches.    Hematological: Does not bruise/bleed eas Right lower leg: No edema. Left lower leg: No edema. Lymphadenopathy:      Cervical: No cervical adenopathy. Skin:     General: Skin is warm and dry. Findings: No rash.    Neurological:      Mental Status: He is alert and oriented to person, p reduction in the number of cigarettes per day. No follow-ups on file.     Orders Placed This Encounter      CBC With Differential With Platelet      Comp Metabolic Panel (14)      Lipid Panel      TSH W Reflex To Free T4      Vitamin D, 25-

## 2021-09-23 ENCOUNTER — OFFICE VISIT (OUTPATIENT)
Dept: SURGERY | Facility: CLINIC | Age: 51
End: 2021-09-23
Payer: COMMERCIAL

## 2021-09-23 VITALS
HEIGHT: 73 IN | SYSTOLIC BLOOD PRESSURE: 150 MMHG | DIASTOLIC BLOOD PRESSURE: 92 MMHG | BODY MASS INDEX: 26.77 KG/M2 | WEIGHT: 202 LBS | RESPIRATION RATE: 16 BRPM | HEART RATE: 82 BPM

## 2021-09-23 DIAGNOSIS — R97.20 ELEVATED PSA: Primary | ICD-10-CM

## 2021-09-23 PROCEDURE — 3080F DIAST BP >= 90 MM HG: CPT | Performed by: UROLOGY

## 2021-09-23 PROCEDURE — 3077F SYST BP >= 140 MM HG: CPT | Performed by: UROLOGY

## 2021-09-23 PROCEDURE — 99204 OFFICE O/P NEW MOD 45 MIN: CPT | Performed by: UROLOGY

## 2021-09-23 PROCEDURE — 3008F BODY MASS INDEX DOCD: CPT | Performed by: UROLOGY

## 2021-09-23 NOTE — PROGRESS NOTES
SUBJECTIVE:  Charlotte Doyle is a 46year old male who presents for a consultation at the request of, and a copy of this note will be sent to, Dr. Talisha Esteban, for evaluation of  elevated PSA. He states that the problem is unchanged.  Symptoms include no claudication. GASTROINTESTINAL:  Negative for nausea, vomiting, diarrhea, constipation, heartburn or indigestion, abdominal pains, bloody or tarry stools. GENERAL: Denies:  weight gain, weight loss, fever, night sweats, bone pain, malaise and fatigue.  Po obtaining his PSA. He understands the importance of follow-up as discussed and will do so accordingly.     Meds This Visit:  Requested Prescriptions      No prescriptions requested or ordered in this encounter       Imaging & Referrals:  None

## 2021-10-16 ENCOUNTER — LAB ENCOUNTER (OUTPATIENT)
Dept: LAB | Facility: HOSPITAL | Age: 51
End: 2021-10-16
Attending: UROLOGY
Payer: COMMERCIAL

## 2021-10-16 DIAGNOSIS — R97.20 ELEVATED PSA: ICD-10-CM

## 2021-10-16 PROCEDURE — 84153 ASSAY OF PSA TOTAL: CPT

## 2021-10-16 PROCEDURE — 36415 COLL VENOUS BLD VENIPUNCTURE: CPT

## 2021-10-19 ENCOUNTER — OFFICE VISIT (OUTPATIENT)
Dept: SURGERY | Facility: CLINIC | Age: 51
End: 2021-10-19
Payer: COMMERCIAL

## 2021-10-19 VITALS
BODY MASS INDEX: 26.51 KG/M2 | RESPIRATION RATE: 16 BRPM | HEART RATE: 100 BPM | DIASTOLIC BLOOD PRESSURE: 82 MMHG | HEIGHT: 73 IN | WEIGHT: 200 LBS | SYSTOLIC BLOOD PRESSURE: 170 MMHG

## 2021-10-19 DIAGNOSIS — R97.20 ELEVATED PSA: Primary | ICD-10-CM

## 2021-10-19 PROCEDURE — 3077F SYST BP >= 140 MM HG: CPT | Performed by: UROLOGY

## 2021-10-19 PROCEDURE — 99213 OFFICE O/P EST LOW 20 MIN: CPT | Performed by: UROLOGY

## 2021-10-19 PROCEDURE — 3079F DIAST BP 80-89 MM HG: CPT | Performed by: UROLOGY

## 2021-10-19 PROCEDURE — 3008F BODY MASS INDEX DOCD: CPT | Performed by: UROLOGY

## 2021-10-19 RX ORDER — CIPROFLOXACIN 500 MG/1
500 TABLET, FILM COATED ORAL 2 TIMES DAILY
Qty: 6 TABLET | Refills: 0 | Status: SHIPPED | OUTPATIENT
Start: 2021-10-19 | End: 2022-01-21

## 2021-10-19 RX ORDER — CEFDINIR 300 MG/1
300 CAPSULE ORAL EVERY 12 HOURS
Qty: 6 CAPSULE | Refills: 0 | Status: SHIPPED | OUTPATIENT
Start: 2021-10-19 | End: 2021-10-22

## 2021-10-19 RX ORDER — VITAMIN B COMPLEX
TABLET ORAL
COMMUNITY
Start: 2021-10-01

## 2021-10-19 NOTE — PROGRESS NOTES
Nuvia Salas is a 46year old male. HPI:   Patient presents with:  elevated psa      66-year-old male in follow-up to visit September 23, 2021 with an elevated serum PSA that peaked at 6.4 9 August 2021.   Has a known family history of prostate cancer by mouth. • aspirin 81 MG Oral Tab Take 81 mg by mouth daily.          Allergies:  No Known Allergies      ROS:       PHYSICAL EXAM:        ASSESSMENT/PLAN:   Assessment   Elevated psa  (primary encounter diagnosis)    Reviewed findings at length with benjamin

## 2021-11-20 ENCOUNTER — IMMUNIZATION (OUTPATIENT)
Dept: LAB | Facility: HOSPITAL | Age: 51
End: 2021-11-20
Attending: EMERGENCY MEDICINE
Payer: COMMERCIAL

## 2021-11-20 DIAGNOSIS — Z23 NEED FOR VACCINATION: Primary | ICD-10-CM

## 2021-11-20 PROCEDURE — 0064A SARSCOV2 VAC 50MCG/0.25ML IM: CPT

## 2022-01-18 ENCOUNTER — TELEPHONE (OUTPATIENT)
Dept: SURGERY | Facility: CLINIC | Age: 52
End: 2022-01-18

## 2022-01-19 ENCOUNTER — TELEPHONE (OUTPATIENT)
Dept: SURGERY | Facility: CLINIC | Age: 52
End: 2022-01-19

## 2022-01-21 ENCOUNTER — PROCEDURE (OUTPATIENT)
Dept: SURGERY | Facility: CLINIC | Age: 52
End: 2022-01-21
Payer: COMMERCIAL

## 2022-01-21 VITALS
HEART RATE: 88 BPM | WEIGHT: 200 LBS | DIASTOLIC BLOOD PRESSURE: 100 MMHG | BODY MASS INDEX: 26 KG/M2 | SYSTOLIC BLOOD PRESSURE: 148 MMHG

## 2022-01-21 DIAGNOSIS — R97.20 ELEVATED PSA: Primary | ICD-10-CM

## 2022-01-21 PROCEDURE — 76942 ECHO GUIDE FOR BIOPSY: CPT | Performed by: UROLOGY

## 2022-01-21 PROCEDURE — 3077F SYST BP >= 140 MM HG: CPT | Performed by: UROLOGY

## 2022-01-21 PROCEDURE — 3080F DIAST BP >= 90 MM HG: CPT | Performed by: UROLOGY

## 2022-01-21 PROCEDURE — 55700 BIOPSY OF PROSTATE,NEEDLE/PUNCH: CPT | Performed by: UROLOGY

## 2022-01-21 NOTE — PROGRESS NOTES
Triston Tejeda is a 46year old male. HPI:   Patient presents with:  elevated psa: patient presents for prostate biopsies       25-year-old male in follow-up to visit 10/19/2021.   Has a family history of prostate cancer was noted to have an elevated PS apex, mid and bases at mid and lateral areas each. Complications: None  Blood loss: Minimal         ASSESSMENT/PLAN:   Assessment   Elevated psa  (primary encounter diagnosis)    Reviewed findings at length with patient.   Recommended follow-up in 2 weeks

## 2022-02-14 ENCOUNTER — OFFICE VISIT (OUTPATIENT)
Dept: SURGERY | Facility: CLINIC | Age: 52
End: 2022-02-14
Payer: COMMERCIAL

## 2022-02-14 DIAGNOSIS — R97.20 ELEVATED PSA: Primary | ICD-10-CM

## 2022-02-14 DIAGNOSIS — C61 PROSTATE CANCER (HCC): ICD-10-CM

## 2022-02-14 PROCEDURE — 99215 OFFICE O/P EST HI 40 MIN: CPT | Performed by: UROLOGY

## 2022-02-17 ENCOUNTER — HOSPITAL ENCOUNTER (OUTPATIENT)
Dept: CT IMAGING | Age: 52
Discharge: HOME | End: 2022-02-17
Attending: UROLOGY
Payer: COMMERCIAL

## 2022-02-17 DIAGNOSIS — C61 PROSTATE CANCER (HCC): ICD-10-CM

## 2022-02-17 LAB — CREAT BLD-MCNC: 0.8 MG/DL

## 2022-02-17 PROCEDURE — 82565 ASSAY OF CREATININE: CPT

## 2022-02-17 PROCEDURE — 74178 CT ABD&PLV WO CNTR FLWD CNTR: CPT | Performed by: UROLOGY

## 2022-02-18 ENCOUNTER — TELEPHONE (OUTPATIENT)
Dept: SURGERY | Facility: CLINIC | Age: 52
End: 2022-02-18

## 2022-02-18 ENCOUNTER — HOSPITAL ENCOUNTER (OUTPATIENT)
Dept: NUCLEAR MEDICINE | Facility: HOSPITAL | Age: 52
Discharge: HOME | End: 2022-02-18
Attending: UROLOGY
Payer: COMMERCIAL

## 2022-02-18 DIAGNOSIS — C61 PROSTATE CANCER (HCC): ICD-10-CM

## 2022-02-18 PROCEDURE — 78306 BONE IMAGING WHOLE BODY: CPT | Performed by: UROLOGY

## 2022-02-18 NOTE — TELEPHONE ENCOUNTER
Called pt. Pt denies any pain. Denies N/V, no urinary sx at all. Dr Lukas Gaines from radiology called office to report finding of obstructing stone found in yesterday's CT scan. Discussed results with PVK since ADDIE out of office. PVK recommends pt follow up with [facility] soon. Called pt back and recommended he follow-up with ADDIE. States he had bone scan done today, waiting on results. Pt states he will have to call office back to schedule f/u appt.

## 2022-02-21 ENCOUNTER — TELEPHONE (OUTPATIENT)
Dept: SURGERY | Facility: CLINIC | Age: 52
End: 2022-02-21

## 2022-02-21 NOTE — TELEPHONE ENCOUNTER
----- Message from Kathie Aquino MD sent at 2/18/2022 12:07 PM CST -----  [facility] staff. I called this patient. I reviewed with him the CT scan results. He is aware no enlarged lymph nodes are seen related to the cancer. He is aware also of a 4 mm left ureteral stone. He denies any pain at this time. No history of stones in the past.     staff please mail him strainers. Make sure he has appt to see me in 1-2 weeks. Also, when I click on the bone scan report in Epic it pulls up the CT scan report. Can we call radiology and see if this can be corrected. I cannot yet see the bone scan result report.   Thanks

## 2022-02-22 NOTE — TELEPHONE ENCOUNTER
Noted.  Bone scan shows no evidence of metastatic disease please call and notify the patient. Follow-up in the next 1 to 2 weeks to review all of these results and the ureteral stone detected incidentally on the CT scan.

## 2022-02-23 NOTE — TELEPHONE ENCOUNTER
S/W pt and informed him of the msg as stacy below in [facility] and I gave pt an appt for Tues 3/1 at 5:50 pm.

## 2022-03-01 ENCOUNTER — HOSPITAL ENCOUNTER (OUTPATIENT)
Dept: GENERAL RADIOLOGY | Facility: HOSPITAL | Age: 52
Discharge: HOME | End: 2022-03-01
Attending: UROLOGY
Payer: COMMERCIAL

## 2022-03-01 ENCOUNTER — OFFICE VISIT (OUTPATIENT)
Dept: SURGERY | Facility: CLINIC | Age: 52
End: 2022-03-01
Payer: COMMERCIAL

## 2022-03-01 ENCOUNTER — TELEPHONE (OUTPATIENT)
Dept: SURGERY | Facility: CLINIC | Age: 52
End: 2022-03-01

## 2022-03-01 DIAGNOSIS — N20.1 LEFT URETERAL STONE: ICD-10-CM

## 2022-03-01 DIAGNOSIS — C61 PROSTATE CANCER (HCC): Primary | ICD-10-CM

## 2022-03-01 PROCEDURE — 74019 RADEX ABDOMEN 2 VIEWS: CPT | Performed by: UROLOGY

## 2022-03-01 PROCEDURE — 99213 OFFICE O/P EST LOW 20 MIN: CPT | Performed by: UROLOGY

## 2022-03-02 NOTE — TELEPHONE ENCOUNTER
Dr. Trammell Drafts, I scheduled this pt with you for consult that was referred by  [facility] MARY CALDERA for prostatectomy. I scheduled him for 3/21/22 Monday at 4:00pm. Is this ok?

## 2022-03-21 ENCOUNTER — OFFICE VISIT (OUTPATIENT)
Dept: SURGERY | Facility: CLINIC | Age: 52
End: 2022-03-21
Payer: COMMERCIAL

## 2022-03-21 DIAGNOSIS — N20.1 LEFT URETERAL STONE: ICD-10-CM

## 2022-03-21 DIAGNOSIS — C61 PROSTATE CANCER (HCC): Primary | ICD-10-CM

## 2022-03-21 PROCEDURE — G2212 PROLONG OUTPT/OFFICE VIS: HCPCS | Performed by: UROLOGY

## 2022-03-21 PROCEDURE — 99215 OFFICE O/P EST HI 40 MIN: CPT | Performed by: UROLOGY

## 2022-03-22 ENCOUNTER — TELEPHONE (OUTPATIENT)
Dept: SURGERY | Facility: CLINIC | Age: 52
End: 2022-03-22

## 2022-03-22 NOTE — TELEPHONE ENCOUNTER
Spoke with patient, determined that patient will have x-ray to determine if stone still present. I will await recommendations before proceeding.

## 2022-03-25 ENCOUNTER — HOSPITAL ENCOUNTER (OUTPATIENT)
Dept: GENERAL RADIOLOGY | Facility: HOSPITAL | Age: 52
Discharge: HOME | End: 2022-03-25
Attending: UROLOGY
Payer: COMMERCIAL

## 2022-03-25 DIAGNOSIS — N20.1 LEFT URETERAL STONE: ICD-10-CM

## 2022-03-25 PROCEDURE — 74018 RADEX ABDOMEN 1 VIEW: CPT | Performed by: UROLOGY

## 2022-03-30 ENCOUNTER — TELEPHONE (OUTPATIENT)
Dept: SURGERY | Facility: CLINIC | Age: 52
End: 2022-03-30

## 2022-03-30 NOTE — TELEPHONE ENCOUNTER
Reviewed XR results with pt and pt verbalized understanding. Then I instructed pt to complete MRI and pt was asking where to call to schedule this and I gave him information for [facility] as listed in the order. Pt confused as to why he has to go to a different facility when he has done all imaging tests through [facility]. I explained that sometimes due to insurance and cost effectiveness reasons, [facility] is another option. Pt got upset and frustrated and wants to know why this wasn't offered to him for prior imaging tests. I told him I will figure out who can better help answer his questions in regards to this. Pt would like call back.

## 2022-04-14 ENCOUNTER — TELEPHONE (OUTPATIENT)
Dept: SURGERY | Facility: CLINIC | Age: 52
End: 2022-04-14

## 2022-04-14 NOTE — TELEPHONE ENCOUNTER
Efraín Sunshine, please contact patient and schedule as follows:    UROLOGY SURGERY REQUEST    Location: [address] [place] [address] OR    Surgeon: Hermelinda Wong MD    Asst. Surgeon: INNA Das    Diagnosis: Prostate cancer. Procedure: Robotic assisted laparoscopic radical prostatectomy, bilateral pelvic lymph node dissection. Procedure CPT Code (if known): [id number], [id number]. Anesthesia: General     Time Frame: 5/17, 5/20 or 5/24    Time needed: 5 hours    Special Equipment: XI Robot    On Call to OR: Ancef (cefazolin) 2 grams IV and flagyl 500 mg IV. Admission: AM Admit    Pre-op Testing: CBC, BMP, PT/INR, PTT, EKG, CXR, Urinalysis, Urine Culture and Type & Screen     Need Pre-op Clearance: PCP    Estimated Post Op/Follow Up Appt: 7-10 days for ventura removal. Please schedule follow-up appointment at time of surgery scheduling.      Yamila Maddox MD  4/14/2022

## 2022-04-20 NOTE — TELEPHONE ENCOUNTER
Called patient and spoke to him. I explained that our surgery scheduler is out of the office and will be back hopefully tomorrow. He should hopefully expect proceed with phone call to schedule surgery within the next few days. Answered his question about getting colonoscopy before surgery. Other questions deferred to his PCP.

## 2022-04-20 NOTE — TELEPHONE ENCOUNTER
Patient calling back to follow up on surgery date. States he also has questions on whether or not to get colonoscopy before surgery.   Please advise

## 2022-04-22 NOTE — TELEPHONE ENCOUNTER
Spoke with patient, scheduled Danita España assisted laparoscopic radical prostatectomy, bilateral pelvic lymph node dissection, tentatively scheduled Friday 06/03/2022, [facility], went over pre-op/lab instructions all will be sent to patient my chart. I will call patient on Monday to confirm.

## 2022-04-26 ENCOUNTER — LAB ENCOUNTER (OUTPATIENT)
Dept: LAB | Facility: HOSPITAL | Age: 52
End: 2022-04-26
Attending: INTERNAL MEDICINE
Payer: COMMERCIAL

## 2022-04-26 ENCOUNTER — TELEPHONE (OUTPATIENT)
Dept: SURGERY | Facility: CLINIC | Age: 52
End: 2022-04-26

## 2022-04-26 ENCOUNTER — OFFICE VISIT (OUTPATIENT)
Dept: INTERNAL MEDICINE CLINIC | Facility: CLINIC | Age: 52
End: 2022-04-26
Payer: COMMERCIAL

## 2022-04-26 VITALS
RESPIRATION RATE: 16 BRPM | SYSTOLIC BLOOD PRESSURE: 135 MMHG | DIASTOLIC BLOOD PRESSURE: 97 MMHG | HEART RATE: 97 BPM | WEIGHT: 196.13 LBS | HEIGHT: 73 IN | BODY MASS INDEX: 25.99 KG/M2

## 2022-04-26 DIAGNOSIS — Z01.818 PREOP EXAMINATION: ICD-10-CM

## 2022-04-26 DIAGNOSIS — F17.200 TOBACCO USE DISORDER: ICD-10-CM

## 2022-04-26 DIAGNOSIS — R39.89 OTHER SYMPTOMS AND SIGNS INVOLVING THE GENITOURINARY SYSTEM: ICD-10-CM

## 2022-04-26 DIAGNOSIS — C61 PROSTATE CANCER (HCC): ICD-10-CM

## 2022-04-26 DIAGNOSIS — Z01.818 PRE-OP EXAM: Primary | ICD-10-CM

## 2022-04-26 DIAGNOSIS — D75.1 ERYTHROCYTOSIS: ICD-10-CM

## 2022-04-26 DIAGNOSIS — Z51.81 MONITORING FOR ANTICOAGULANT USE: ICD-10-CM

## 2022-04-26 DIAGNOSIS — E78.00 HYPERCHOLESTEROLEMIA: ICD-10-CM

## 2022-04-26 DIAGNOSIS — I10 ESSENTIAL HYPERTENSION: ICD-10-CM

## 2022-04-26 DIAGNOSIS — Z12.11 SCREEN FOR COLON CANCER: ICD-10-CM

## 2022-04-26 DIAGNOSIS — Z79.01 MONITORING FOR ANTICOAGULANT USE: ICD-10-CM

## 2022-04-26 PROBLEM — Z12.5 SCREENING FOR PROSTATE CANCER: Status: ACTIVE | Noted: 2018-01-09

## 2022-04-26 PROBLEM — Z12.5 SCREENING FOR PROSTATE CANCER: Status: RESOLVED | Noted: 2018-01-09 | Resolved: 2022-04-26

## 2022-04-26 LAB
ALBUMIN SERPL-MCNC: 4.1 G/DL (ref 3.4–5)
ALP LIVER SERPL-CCNC: 79 U/L
ALT SERPL-CCNC: 20 U/L
ANION GAP SERPL CALC-SCNC: 5 MMOL/L (ref 0–18)
ANTIBODY SCREEN: NEGATIVE
APTT PPP: 28.7 SECONDS (ref 23.3–35.6)
AST SERPL-CCNC: 16 U/L (ref 15–37)
BASOPHILS # BLD AUTO: 0.09 X10(3) UL (ref 0–0.2)
BASOPHILS NFR BLD AUTO: 0.8 %
BILIRUB DIRECT SERPL-MCNC: 0.2 MG/DL (ref 0–0.2)
BILIRUB SERPL-MCNC: 0.6 MG/DL (ref 0.1–2)
BILIRUB UR QL: NEGATIVE
BUN BLD-MCNC: 16 MG/DL (ref 7–18)
BUN/CREAT SERPL: 15.5 (ref 10–20)
CALCIUM BLD-MCNC: 9.2 MG/DL (ref 8.5–10.1)
CHLORIDE SERPL-SCNC: 104 MMOL/L (ref 98–112)
CHOLEST SERPL-MCNC: 220 MG/DL (ref ?–200)
CLARITY UR: CLEAR
CO2 SERPL-SCNC: 29 MMOL/L (ref 21–32)
COLOR UR: YELLOW
CREAT BLD-MCNC: 1.03 MG/DL
DEPRECATED RDW RBC AUTO: 43.5 FL (ref 35.1–46.3)
EOSINOPHIL # BLD AUTO: 0.21 X10(3) UL (ref 0–0.7)
EOSINOPHIL NFR BLD AUTO: 2 %
ERYTHROCYTE [DISTWIDTH] IN BLOOD BY AUTOMATED COUNT: 12 % (ref 11–15)
GLUCOSE BLD-MCNC: 91 MG/DL (ref 70–99)
GLUCOSE UR-MCNC: NEGATIVE MG/DL
HCT VFR BLD AUTO: 53.7 %
HDLC SERPL-MCNC: 57 MG/DL (ref 40–59)
HGB BLD-MCNC: 18.8 G/DL
IMM GRANULOCYTES # BLD AUTO: 0.03 X10(3) UL (ref 0–1)
IMM GRANULOCYTES NFR BLD: 0.3 %
INR BLD: 0.95 (ref 0.8–1.2)
LDLC SERPL CALC-MCNC: 146 MG/DL (ref ?–100)
LEUKOCYTE ESTERASE UR QL STRIP.AUTO: NEGATIVE
LYMPHOCYTES # BLD AUTO: 2.51 X10(3) UL (ref 1–4)
LYMPHOCYTES NFR BLD AUTO: 23.4 %
MCH RBC QN AUTO: 33.9 PG (ref 26–34)
MCHC RBC AUTO-ENTMCNC: 35 G/DL (ref 31–37)
MCV RBC AUTO: 96.8 FL
MONOCYTES # BLD AUTO: 1.17 X10(3) UL (ref 0.1–1)
MONOCYTES NFR BLD AUTO: 10.9 %
NEUTROPHILS # BLD AUTO: 6.7 X10 (3) UL (ref 1.5–7.7)
NEUTROPHILS # BLD AUTO: 6.7 X10(3) UL (ref 1.5–7.7)
NEUTROPHILS NFR BLD AUTO: 62.6 %
NITRITE UR QL STRIP.AUTO: NEGATIVE
NONHDLC SERPL-MCNC: 163 MG/DL (ref ?–130)
OSMOLALITY SERPL CALC.SUM OF ELEC: 287 MOSM/KG (ref 275–295)
PH UR: 5 [PH] (ref 5–8)
PLATELET # BLD AUTO: 329 10(3)UL (ref 150–450)
POTASSIUM SERPL-SCNC: 4.1 MMOL/L (ref 3.5–5.1)
PROT SERPL-MCNC: 7.9 G/DL (ref 6.4–8.2)
PROT UR-MCNC: NEGATIVE MG/DL
PROTHROMBIN TIME: 12.8 SECONDS (ref 11.6–14.8)
RBC # BLD AUTO: 5.55 X10(6)UL
RH BLOOD TYPE: POSITIVE
RH BLOOD TYPE: POSITIVE
SODIUM SERPL-SCNC: 138 MMOL/L (ref 136–145)
SP GR UR STRIP: 1.02 (ref 1–1.03)
TRIGL SERPL-MCNC: 96 MG/DL (ref 30–149)
UROBILINOGEN UR STRIP-ACNC: <2
VIT C UR-MCNC: NEGATIVE MG/DL
VLDLC SERPL CALC-MCNC: 18 MG/DL (ref 0–30)
WBC # BLD AUTO: 10.7 X10(3) UL (ref 4–11)

## 2022-04-26 PROCEDURE — 3008F BODY MASS INDEX DOCD: CPT | Performed by: INTERNAL MEDICINE

## 2022-04-26 PROCEDURE — 99215 OFFICE O/P EST HI 40 MIN: CPT | Performed by: INTERNAL MEDICINE

## 2022-04-26 PROCEDURE — 80076 HEPATIC FUNCTION PANEL: CPT

## 2022-04-26 PROCEDURE — 81001 URINALYSIS AUTO W/SCOPE: CPT

## 2022-04-26 PROCEDURE — 87086 URINE CULTURE/COLONY COUNT: CPT | Performed by: UROLOGY

## 2022-04-26 PROCEDURE — 86900 BLOOD TYPING SEROLOGIC ABO: CPT

## 2022-04-26 PROCEDURE — 3075F SYST BP GE 130 - 139MM HG: CPT | Performed by: INTERNAL MEDICINE

## 2022-04-26 PROCEDURE — 80061 LIPID PANEL: CPT

## 2022-04-26 PROCEDURE — 85025 COMPLETE CBC W/AUTO DIFF WBC: CPT

## 2022-04-26 PROCEDURE — 36415 COLL VENOUS BLD VENIPUNCTURE: CPT

## 2022-04-26 PROCEDURE — 80048 BASIC METABOLIC PNL TOTAL CA: CPT

## 2022-04-26 PROCEDURE — 86901 BLOOD TYPING SEROLOGIC RH(D): CPT

## 2022-04-26 PROCEDURE — 3080F DIAST BP >= 90 MM HG: CPT | Performed by: INTERNAL MEDICINE

## 2022-04-26 PROCEDURE — 86850 RBC ANTIBODY SCREEN: CPT

## 2022-04-26 PROCEDURE — 85610 PROTHROMBIN TIME: CPT

## 2022-04-26 PROCEDURE — 85730 THROMBOPLASTIN TIME PARTIAL: CPT

## 2022-04-26 RX ORDER — AMLODIPINE BESYLATE 5 MG/1
5 TABLET ORAL DAILY
Qty: 90 TABLET | Refills: 1 | Status: SHIPPED | OUTPATIENT
Start: 2022-04-26

## 2022-04-26 NOTE — TELEPHONE ENCOUNTER
Dear Clinton Moore, this is to inform, patient is scheduled for [facility] Robotic assisted laparoscopic radical prostatectomy, bilateral pelvic lymph node dissection, Friday 05/20/2022, Ridd[facility] Crum' requesting pre-op medical clearance.

## 2022-04-27 ENCOUNTER — TELEPHONE (OUTPATIENT)
Dept: SURGERY | Facility: CLINIC | Age: 52
End: 2022-04-27

## 2022-04-27 NOTE — PROGRESS NOTES
Your blood tests are okay except for your hemoglobin and hematocrit, which are high. This can be caused by supplements, especially if you are taking testosterone. I recommend that you stop taking any supplements. This abnormality can also be due to smoking and/or dehydration. Please drink plenty of water and wean off cigarettes. Your cholesterol is high, but it has improved.

## 2022-04-27 NOTE — TELEPHONE ENCOUNTER
Forms were faxed to Urology to be completed, Sent to  Forms and place in [facility]

## 2022-04-27 NOTE — ASSESSMENT & PLAN NOTE
Will add amlodipine and f/u with pt in 2 weeks regarding blood pressure. Pre-op labs and EKG were ordered by Dr. Gianluca Sanders. Will check results.

## 2022-04-30 ENCOUNTER — LAB ENCOUNTER (OUTPATIENT)
Dept: LAB | Facility: HOSPITAL | Age: 52
End: 2022-04-30
Attending: INTERNAL MEDICINE
Payer: COMMERCIAL

## 2022-04-30 DIAGNOSIS — E78.00 HYPERCHOLESTEROLEMIA: ICD-10-CM

## 2022-04-30 DIAGNOSIS — Z01.818 PREOP EXAMINATION: ICD-10-CM

## 2022-04-30 LAB
ANION GAP SERPL CALC-SCNC: 7 MMOL/L (ref 0–18)
BUN BLD-MCNC: 12 MG/DL (ref 7–18)
BUN/CREAT SERPL: 11.8 (ref 10–20)
CALCIUM BLD-MCNC: 9.1 MG/DL (ref 8.5–10.1)
CHLORIDE SERPL-SCNC: 106 MMOL/L (ref 98–112)
CHOLEST SERPL-MCNC: 197 MG/DL (ref ?–200)
CO2 SERPL-SCNC: 28 MMOL/L (ref 21–32)
CREAT BLD-MCNC: 1.02 MG/DL
FASTING PATIENT LIPID ANSWER: YES
FASTING STATUS PATIENT QL REPORTED: YES
GLUCOSE BLD-MCNC: 80 MG/DL (ref 70–99)
HDLC SERPL-MCNC: 46 MG/DL (ref 40–59)
LDLC SERPL CALC-MCNC: 124 MG/DL (ref ?–100)
NONHDLC SERPL-MCNC: 151 MG/DL (ref ?–130)
OSMOLALITY SERPL CALC.SUM OF ELEC: 291 MOSM/KG (ref 275–295)
POTASSIUM SERPL-SCNC: 4.8 MMOL/L (ref 3.5–5.1)
SODIUM SERPL-SCNC: 141 MMOL/L (ref 136–145)
TRIGL SERPL-MCNC: 152 MG/DL (ref 30–149)
VLDLC SERPL CALC-MCNC: 27 MG/DL (ref 0–30)

## 2022-04-30 PROCEDURE — 80061 LIPID PANEL: CPT

## 2022-04-30 PROCEDURE — 80048 BASIC METABOLIC PNL TOTAL CA: CPT

## 2022-04-30 PROCEDURE — 36415 COLL VENOUS BLD VENIPUNCTURE: CPT

## 2022-05-05 NOTE — TELEPHONE ENCOUNTER
Dr. Peyton Cano     Please sign off on form:Disab  -Highlight the patient and hit \"Chart\" button. -In Chart Review, w/in the Encounter tab - click 1 time on the Telephone call encounter for 4/27/22 Scroll down the telephone encounter.  -Click \"scan on\" blue Hyperlink under \"Media\" heading for Disab Dr Peyton Cano 5/5/22  w/in the telephone enc.  -Click on Acknowledge button at the bottom right corner and left-click onto image, signature stamp appears and drag signature to Provider signature line. Stamp will turn blue. Close window.      Thank you,    Berenice Everett

## 2022-05-10 ENCOUNTER — LAB ENCOUNTER (OUTPATIENT)
Dept: LAB | Facility: HOSPITAL | Age: 52
End: 2022-05-10
Attending: INTERNAL MEDICINE
Payer: COMMERCIAL

## 2022-05-10 ENCOUNTER — OFFICE VISIT (OUTPATIENT)
Dept: INTERNAL MEDICINE CLINIC | Facility: CLINIC | Age: 52
End: 2022-05-10
Payer: COMMERCIAL

## 2022-05-10 VITALS
HEART RATE: 88 BPM | SYSTOLIC BLOOD PRESSURE: 124 MMHG | BODY MASS INDEX: 25.95 KG/M2 | WEIGHT: 195.81 LBS | DIASTOLIC BLOOD PRESSURE: 76 MMHG | HEIGHT: 73 IN | RESPIRATION RATE: 18 BRPM

## 2022-05-10 DIAGNOSIS — C61 PROSTATE CANCER (HCC): ICD-10-CM

## 2022-05-10 DIAGNOSIS — Z01.818 PREOP EXAMINATION: ICD-10-CM

## 2022-05-10 DIAGNOSIS — D75.1 ERYTHROCYTOSIS: ICD-10-CM

## 2022-05-10 DIAGNOSIS — F17.200 TOBACCO USE DISORDER: ICD-10-CM

## 2022-05-10 DIAGNOSIS — I10 ESSENTIAL HYPERTENSION: Primary | ICD-10-CM

## 2022-05-10 DIAGNOSIS — D75.1 POLYCYTHEMIA, SECONDARY: Primary | ICD-10-CM

## 2022-05-10 LAB
BASOPHILS # BLD AUTO: 0.05 X10(3) UL (ref 0–0.2)
BASOPHILS NFR BLD AUTO: 0.6 %
DEPRECATED RDW RBC AUTO: 45.2 FL (ref 35.1–46.3)
EOSINOPHIL # BLD AUTO: 0.27 X10(3) UL (ref 0–0.7)
EOSINOPHIL NFR BLD AUTO: 3.2 %
ERYTHROCYTE [DISTWIDTH] IN BLOOD BY AUTOMATED COUNT: 12.6 % (ref 11–15)
HCT VFR BLD AUTO: 50.6 %
HGB BLD-MCNC: 17 G/DL
IMM GRANULOCYTES # BLD AUTO: 0.03 X10(3) UL (ref 0–1)
IMM GRANULOCYTES NFR BLD: 0.4 %
LYMPHOCYTES # BLD AUTO: 2.38 X10(3) UL (ref 1–4)
LYMPHOCYTES NFR BLD AUTO: 28 %
MCH RBC QN AUTO: 32.9 PG (ref 26–34)
MCHC RBC AUTO-ENTMCNC: 33.6 G/DL (ref 31–37)
MCV RBC AUTO: 97.9 FL
MONOCYTES # BLD AUTO: 0.89 X10(3) UL (ref 0.1–1)
MONOCYTES NFR BLD AUTO: 10.5 %
NEUTROPHILS # BLD AUTO: 4.88 X10 (3) UL (ref 1.5–7.7)
NEUTROPHILS # BLD AUTO: 4.88 X10(3) UL (ref 1.5–7.7)
NEUTROPHILS NFR BLD AUTO: 57.3 %
PLATELET # BLD AUTO: 304 10(3)UL (ref 150–450)
RBC # BLD AUTO: 5.17 X10(6)UL
WBC # BLD AUTO: 8.5 X10(3) UL (ref 4–11)

## 2022-05-10 PROCEDURE — 99214 OFFICE O/P EST MOD 30 MIN: CPT | Performed by: INTERNAL MEDICINE

## 2022-05-10 PROCEDURE — 82668 ASSAY OF ERYTHROPOIETIN: CPT

## 2022-05-10 PROCEDURE — 93010 ELECTROCARDIOGRAM REPORT: CPT | Performed by: UROLOGY

## 2022-05-10 PROCEDURE — 3008F BODY MASS INDEX DOCD: CPT | Performed by: INTERNAL MEDICINE

## 2022-05-10 PROCEDURE — 3074F SYST BP LT 130 MM HG: CPT | Performed by: INTERNAL MEDICINE

## 2022-05-10 PROCEDURE — 36415 COLL VENOUS BLD VENIPUNCTURE: CPT

## 2022-05-10 PROCEDURE — 85025 COMPLETE CBC W/AUTO DIFF WBC: CPT

## 2022-05-10 PROCEDURE — 3078F DIAST BP <80 MM HG: CPT | Performed by: INTERNAL MEDICINE

## 2022-05-10 PROCEDURE — 93005 ELECTROCARDIOGRAM TRACING: CPT

## 2022-05-10 NOTE — ASSESSMENT & PLAN NOTE
Recheck CBC today. Patient denies taking any testosterone or other types of supplements. If the RBCs are still high, I recommend that he see the hematologist.  Antonieta Horton patient the importance of quitting smoking.

## 2022-05-10 NOTE — ASSESSMENT & PLAN NOTE
Patient's blood pressure is much better since starting amlodipine. Continue amlodipine and losartan. Follow-up in 3 months.

## 2022-05-10 NOTE — ASSESSMENT & PLAN NOTE
Urged patient to quit smoking. Advised patient that it is having a serious deleterious effect on his health as evidenced by the erythrocytosis.

## 2022-05-12 LAB — ERYTHROPOIETIN (EPO): 4 MU/ML

## 2022-05-17 ENCOUNTER — LAB ENCOUNTER (OUTPATIENT)
Dept: LAB | Facility: HOSPITAL | Age: 52
End: 2022-05-17
Attending: UROLOGY
Payer: COMMERCIAL

## 2022-05-17 DIAGNOSIS — Z01.818 PREOP TESTING: ICD-10-CM

## 2022-05-18 LAB — SARS-COV-2 RNA RESP QL NAA+PROBE: NOT DETECTED

## 2022-05-20 ENCOUNTER — ANESTHESIA (OUTPATIENT)
Dept: SURGERY | Facility: HOSPITAL | Age: 52
End: 2022-05-20
Payer: COMMERCIAL

## 2022-05-20 ENCOUNTER — HOSPITAL ENCOUNTER (OUTPATIENT)
Facility: HOSPITAL | Age: 52
Discharge: HOME | End: 2022-05-21
Attending: UROLOGY | Admitting: UROLOGY
Payer: COMMERCIAL

## 2022-05-20 ENCOUNTER — ANESTHESIA EVENT (OUTPATIENT)
Dept: SURGERY | Facility: HOSPITAL | Age: 52
End: 2022-05-20
Payer: COMMERCIAL

## 2022-05-20 DIAGNOSIS — C61 PROSTATE CANCER (HCC): ICD-10-CM

## 2022-05-20 DIAGNOSIS — Z01.818 PREOP TESTING: ICD-10-CM

## 2022-05-20 DIAGNOSIS — Z01.818 PRE-OP EXAM: Primary | ICD-10-CM

## 2022-05-20 PROCEDURE — 07TC4ZZ RESECTION OF PELVIS LYMPHATIC, PERCUTANEOUS ENDOSCOPIC APPROACH: ICD-10-PCS | Performed by: UROLOGY

## 2022-05-20 PROCEDURE — 55866 LAPS SURG PRST8ECT RPBIC RAD: CPT | Performed by: UROLOGY

## 2022-05-20 PROCEDURE — P9045 ALBUMIN (HUMAN), 5%, 250 ML: HCPCS | Performed by: NURSE ANESTHETIST, CERTIFIED REGISTERED

## 2022-05-20 PROCEDURE — 0VT04ZZ RESECTION OF PROSTATE, PERCUTANEOUS ENDOSCOPIC APPROACH: ICD-10-PCS | Performed by: UROLOGY

## 2022-05-20 PROCEDURE — 38571 LAPAROSCOPY LYMPHADENECTOMY: CPT | Performed by: UROLOGY

## 2022-05-20 PROCEDURE — 8E0W4CZ ROBOTIC ASSISTED PROCEDURE OF TRUNK REGION, PERCUTANEOUS ENDOSCOPIC APPROACH: ICD-10-PCS | Performed by: UROLOGY

## 2022-05-20 PROCEDURE — 99214 OFFICE O/P EST MOD 30 MIN: CPT | Performed by: HOSPITALIST

## 2022-05-20 RX ORDER — MAGNESIUM SULFATE HEPTAHYDRATE 500 MG/ML
INJECTION, SOLUTION INTRAMUSCULAR; INTRAVENOUS AS NEEDED
Status: DISCONTINUED | OUTPATIENT
Start: 2022-05-20 | End: 2022-05-20 | Stop reason: SURG

## 2022-05-20 RX ORDER — NICOTINE 21 MG/24HR
1 PATCH, TRANSDERMAL 24 HOURS TRANSDERMAL DAILY
Status: DISCONTINUED | OUTPATIENT
Start: 2022-05-20 | End: 2022-05-21

## 2022-05-20 RX ORDER — SENNA AND DOCUSATE SODIUM 50; 8.6 MG/1; MG/1
2 TABLET, FILM COATED ORAL NIGHTLY
Status: DISCONTINUED | OUTPATIENT
Start: 2022-05-20 | End: 2022-05-21

## 2022-05-20 RX ORDER — MORPHINE SULFATE 4 MG/ML
2 INJECTION, SOLUTION INTRAMUSCULAR; INTRAVENOUS EVERY 10 MIN PRN
Status: DISCONTINUED | OUTPATIENT
Start: 2022-05-20 | End: 2022-05-20 | Stop reason: HOSPADM

## 2022-05-20 RX ORDER — SODIUM CHLORIDE, SODIUM LACTATE, POTASSIUM CHLORIDE, CALCIUM CHLORIDE 600; 310; 30; 20 MG/100ML; MG/100ML; MG/100ML; MG/100ML
INJECTION, SOLUTION INTRAVENOUS CONTINUOUS
Status: DISCONTINUED | OUTPATIENT
Start: 2022-05-20 | End: 2022-05-20 | Stop reason: HOSPADM

## 2022-05-20 RX ORDER — OXYCODONE HYDROCHLORIDE 5 MG/1
10 TABLET ORAL EVERY 4 HOURS PRN
Status: DISCONTINUED | OUTPATIENT
Start: 2022-05-20 | End: 2022-05-21

## 2022-05-20 RX ORDER — HYDROMORPHONE HYDROCHLORIDE 1 MG/ML
0.4 INJECTION, SOLUTION INTRAMUSCULAR; INTRAVENOUS; SUBCUTANEOUS EVERY 4 HOURS PRN
Status: DISCONTINUED | OUTPATIENT
Start: 2022-05-20 | End: 2022-05-21

## 2022-05-20 RX ORDER — CEFAZOLIN SODIUM/WATER 2 G/20 ML
2 SYRINGE (ML) INTRAVENOUS EVERY 8 HOURS
Status: COMPLETED | OUTPATIENT
Start: 2022-05-20 | End: 2022-05-21

## 2022-05-20 RX ORDER — LIDOCAINE HYDROCHLORIDE ANHYDROUS AND DEXTROSE MONOHYDRATE .8; 5 G/100ML; G/100ML
INJECTION, SOLUTION INTRAVENOUS CONTINUOUS PRN
Status: DISCONTINUED | OUTPATIENT
Start: 2022-05-20 | End: 2022-05-20 | Stop reason: SURG

## 2022-05-20 RX ORDER — HYDROMORPHONE HYDROCHLORIDE 1 MG/ML
0.6 INJECTION, SOLUTION INTRAMUSCULAR; INTRAVENOUS; SUBCUTANEOUS EVERY 5 MIN PRN
Status: DISCONTINUED | OUTPATIENT
Start: 2022-05-20 | End: 2022-05-20 | Stop reason: HOSPADM

## 2022-05-20 RX ORDER — SODIUM CHLORIDE, SODIUM LACTATE, POTASSIUM CHLORIDE, CALCIUM CHLORIDE 600; 310; 30; 20 MG/100ML; MG/100ML; MG/100ML; MG/100ML
INJECTION, SOLUTION INTRAVENOUS CONTINUOUS
Status: DISCONTINUED | OUTPATIENT
Start: 2022-05-20 | End: 2022-05-21

## 2022-05-20 RX ORDER — ONDANSETRON 4 MG/1
4 TABLET, FILM COATED ORAL EVERY 6 HOURS PRN
Status: DISCONTINUED | OUTPATIENT
Start: 2022-05-20 | End: 2022-05-21

## 2022-05-20 RX ORDER — PROCHLORPERAZINE EDISYLATE 5 MG/ML
5 INJECTION INTRAMUSCULAR; INTRAVENOUS EVERY 8 HOURS PRN
Status: DISCONTINUED | OUTPATIENT
Start: 2022-05-20 | End: 2022-05-20 | Stop reason: HOSPADM

## 2022-05-20 RX ORDER — METRONIDAZOLE 500 MG/100ML
500 INJECTION, SOLUTION INTRAVENOUS ONCE
Status: COMPLETED | OUTPATIENT
Start: 2022-05-20 | End: 2022-05-21

## 2022-05-20 RX ORDER — ENOXAPARIN SODIUM 100 MG/ML
40 INJECTION SUBCUTANEOUS DAILY
Status: DISCONTINUED | OUTPATIENT
Start: 2022-05-20 | End: 2022-05-21

## 2022-05-20 RX ORDER — CEFAZOLIN SODIUM/WATER 2 G/20 ML
2 SYRINGE (ML) INTRAVENOUS ONCE
Status: COMPLETED | OUTPATIENT
Start: 2022-05-20 | End: 2022-05-20

## 2022-05-20 RX ORDER — KETAMINE HYDROCHLORIDE 50 MG/ML
INJECTION, SOLUTION, CONCENTRATE INTRAMUSCULAR; INTRAVENOUS AS NEEDED
Status: DISCONTINUED | OUTPATIENT
Start: 2022-05-20 | End: 2022-05-20 | Stop reason: SURG

## 2022-05-20 RX ORDER — PHENYLEPHRINE HCL 10 MG/ML
VIAL (ML) INJECTION AS NEEDED
Status: DISCONTINUED | OUTPATIENT
Start: 2022-05-20 | End: 2022-05-20 | Stop reason: SURG

## 2022-05-20 RX ORDER — ALBUMIN, HUMAN INJ 5% 5 %
SOLUTION INTRAVENOUS CONTINUOUS PRN
Status: DISCONTINUED | OUTPATIENT
Start: 2022-05-20 | End: 2022-05-20 | Stop reason: SURG

## 2022-05-20 RX ORDER — MIDAZOLAM HYDROCHLORIDE 1 MG/ML
INJECTION INTRAMUSCULAR; INTRAVENOUS AS NEEDED
Status: DISCONTINUED | OUTPATIENT
Start: 2022-05-20 | End: 2022-05-20 | Stop reason: SURG

## 2022-05-20 RX ORDER — LIDOCAINE HYDROCHLORIDE 10 MG/ML
INJECTION, SOLUTION EPIDURAL; INFILTRATION; INTRACAUDAL; PERINEURAL AS NEEDED
Status: DISCONTINUED | OUTPATIENT
Start: 2022-05-20 | End: 2022-05-20 | Stop reason: SURG

## 2022-05-20 RX ORDER — EPHEDRINE SULFATE 50 MG/ML
INJECTION, SOLUTION INTRAVENOUS AS NEEDED
Status: DISCONTINUED | OUTPATIENT
Start: 2022-05-20 | End: 2022-05-20 | Stop reason: SURG

## 2022-05-20 RX ORDER — MORPHINE SULFATE 4 MG/ML
4 INJECTION, SOLUTION INTRAMUSCULAR; INTRAVENOUS EVERY 10 MIN PRN
Status: DISCONTINUED | OUTPATIENT
Start: 2022-05-20 | End: 2022-05-20 | Stop reason: HOSPADM

## 2022-05-20 RX ORDER — SODIUM CHLORIDE, SODIUM LACTATE, POTASSIUM CHLORIDE, CALCIUM CHLORIDE 600; 310; 30; 20 MG/100ML; MG/100ML; MG/100ML; MG/100ML
INJECTION, SOLUTION INTRAVENOUS CONTINUOUS
Status: DISCONTINUED | OUTPATIENT
Start: 2022-05-20 | End: 2022-05-20

## 2022-05-20 RX ORDER — MORPHINE SULFATE 10 MG/ML
6 INJECTION, SOLUTION INTRAMUSCULAR; INTRAVENOUS EVERY 10 MIN PRN
Status: DISCONTINUED | OUTPATIENT
Start: 2022-05-20 | End: 2022-05-20 | Stop reason: HOSPADM

## 2022-05-20 RX ORDER — GLYCOPYRROLATE 0.2 MG/ML
INJECTION, SOLUTION INTRAMUSCULAR; INTRAVENOUS AS NEEDED
Status: DISCONTINUED | OUTPATIENT
Start: 2022-05-20 | End: 2022-05-20 | Stop reason: SURG

## 2022-05-20 RX ORDER — ROCURONIUM BROMIDE 10 MG/ML
INJECTION, SOLUTION INTRAVENOUS AS NEEDED
Status: DISCONTINUED | OUTPATIENT
Start: 2022-05-20 | End: 2022-05-20 | Stop reason: SURG

## 2022-05-20 RX ORDER — NALOXONE HYDROCHLORIDE 0.4 MG/ML
80 INJECTION, SOLUTION INTRAMUSCULAR; INTRAVENOUS; SUBCUTANEOUS AS NEEDED
Status: DISCONTINUED | OUTPATIENT
Start: 2022-05-20 | End: 2022-05-20 | Stop reason: HOSPADM

## 2022-05-20 RX ORDER — HYDROMORPHONE HYDROCHLORIDE 1 MG/ML
0.4 INJECTION, SOLUTION INTRAMUSCULAR; INTRAVENOUS; SUBCUTANEOUS EVERY 5 MIN PRN
Status: DISCONTINUED | OUTPATIENT
Start: 2022-05-20 | End: 2022-05-20 | Stop reason: HOSPADM

## 2022-05-20 RX ORDER — ACETAMINOPHEN 10 MG/ML
1000 INJECTION, SOLUTION INTRAVENOUS EVERY 8 HOURS
Status: COMPLETED | OUTPATIENT
Start: 2022-05-20 | End: 2022-05-21

## 2022-05-20 RX ORDER — HYDROMORPHONE HYDROCHLORIDE 1 MG/ML
0.2 INJECTION, SOLUTION INTRAMUSCULAR; INTRAVENOUS; SUBCUTANEOUS EVERY 5 MIN PRN
Status: DISCONTINUED | OUTPATIENT
Start: 2022-05-20 | End: 2022-05-20 | Stop reason: HOSPADM

## 2022-05-20 RX ORDER — SODIUM CHLORIDE 9 MG/ML
INJECTION, SOLUTION INTRAVENOUS CONTINUOUS PRN
Status: DISCONTINUED | OUTPATIENT
Start: 2022-05-20 | End: 2022-05-20 | Stop reason: SURG

## 2022-05-20 RX ORDER — LOSARTAN POTASSIUM 100 MG/1
100 TABLET ORAL DAILY
Status: DISCONTINUED | OUTPATIENT
Start: 2022-05-21 | End: 2022-05-21

## 2022-05-20 RX ORDER — BUPIVACAINE HYDROCHLORIDE 5 MG/ML
INJECTION, SOLUTION EPIDURAL; INTRACAUDAL AS NEEDED
Status: DISCONTINUED | OUTPATIENT
Start: 2022-05-20 | End: 2022-05-20 | Stop reason: HOSPADM

## 2022-05-20 RX ORDER — ONDANSETRON 2 MG/ML
INJECTION INTRAMUSCULAR; INTRAVENOUS AS NEEDED
Status: DISCONTINUED | OUTPATIENT
Start: 2022-05-20 | End: 2022-05-20 | Stop reason: SURG

## 2022-05-20 RX ORDER — METHYLENE BLUE 10 MG/ML
INJECTION INTRAVENOUS AS NEEDED
Status: DISCONTINUED | OUTPATIENT
Start: 2022-05-20 | End: 2022-05-20 | Stop reason: HOSPADM

## 2022-05-20 RX ORDER — DEXAMETHASONE SODIUM PHOSPHATE 4 MG/ML
VIAL (ML) INJECTION AS NEEDED
Status: DISCONTINUED | OUTPATIENT
Start: 2022-05-20 | End: 2022-05-20 | Stop reason: SURG

## 2022-05-20 RX ORDER — OXYCODONE HYDROCHLORIDE 5 MG/1
5 TABLET ORAL EVERY 4 HOURS PRN
Status: DISCONTINUED | OUTPATIENT
Start: 2022-05-20 | End: 2022-05-21

## 2022-05-20 RX ORDER — ACETAMINOPHEN 500 MG
1000 TABLET ORAL ONCE
Status: COMPLETED | OUTPATIENT
Start: 2022-05-20 | End: 2022-05-20

## 2022-05-20 RX ORDER — ONDANSETRON 2 MG/ML
4 INJECTION INTRAMUSCULAR; INTRAVENOUS EVERY 6 HOURS PRN
Status: DISCONTINUED | OUTPATIENT
Start: 2022-05-20 | End: 2022-05-20 | Stop reason: HOSPADM

## 2022-05-20 RX ORDER — ONDANSETRON 2 MG/ML
4 INJECTION INTRAMUSCULAR; INTRAVENOUS EVERY 6 HOURS PRN
Status: DISCONTINUED | OUTPATIENT
Start: 2022-05-20 | End: 2022-05-21

## 2022-05-20 RX ORDER — AMLODIPINE BESYLATE 5 MG/1
5 TABLET ORAL DAILY
Status: DISCONTINUED | OUTPATIENT
Start: 2022-05-21 | End: 2022-05-21

## 2022-05-20 RX ADMIN — MAGNESIUM SULFATE HEPTAHYDRATE 1 G: 500 INJECTION, SOLUTION INTRAMUSCULAR; INTRAVENOUS at 10:00:00

## 2022-05-20 RX ADMIN — EPHEDRINE SULFATE 5 MG: 50 INJECTION, SOLUTION INTRAVENOUS at 10:35:00

## 2022-05-20 RX ADMIN — ROCURONIUM BROMIDE 10 MG: 10 INJECTION, SOLUTION INTRAVENOUS at 10:33:00

## 2022-05-20 RX ADMIN — KETAMINE HYDROCHLORIDE 20 MG: 50 INJECTION, SOLUTION, CONCENTRATE INTRAMUSCULAR; INTRAVENOUS at 08:22:00

## 2022-05-20 RX ADMIN — LIDOCAINE HYDROCHLORIDE ANHYDROUS AND DEXTROSE MONOHYDRATE 1 MG/MIN: .8; 5 INJECTION, SOLUTION INTRAVENOUS at 08:10:00

## 2022-05-20 RX ADMIN — ONDANSETRON 4 MG: 2 INJECTION INTRAMUSCULAR; INTRAVENOUS at 12:38:00

## 2022-05-20 RX ADMIN — CEFAZOLIN SODIUM/WATER 2 G: 2 G/20 ML SYRINGE (ML) INTRAVENOUS at 07:56:00

## 2022-05-20 RX ADMIN — PHENYLEPHRINE HCL 100 MCG: 10 MG/ML VIAL (ML) INJECTION at 09:31:00

## 2022-05-20 RX ADMIN — ALBUMIN, HUMAN INJ 5%: 5 SOLUTION INTRAVENOUS at 10:26:00

## 2022-05-20 RX ADMIN — ROCURONIUM BROMIDE 10 MG: 10 INJECTION, SOLUTION INTRAVENOUS at 08:42:00

## 2022-05-20 RX ADMIN — PHENYLEPHRINE HCL 100 MCG: 10 MG/ML VIAL (ML) INJECTION at 09:02:00

## 2022-05-20 RX ADMIN — CEFAZOLIN SODIUM/WATER 2 G: 2 G/20 ML SYRINGE (ML) INTRAVENOUS at 11:56:00

## 2022-05-20 RX ADMIN — SODIUM CHLORIDE: 9 INJECTION, SOLUTION INTRAVENOUS at 07:49:00

## 2022-05-20 RX ADMIN — MIDAZOLAM HYDROCHLORIDE 2 MG: 1 INJECTION INTRAMUSCULAR; INTRAVENOUS at 07:33:00

## 2022-05-20 RX ADMIN — ROCURONIUM BROMIDE 10 MG: 10 INJECTION, SOLUTION INTRAVENOUS at 09:43:00

## 2022-05-20 RX ADMIN — KETAMINE HYDROCHLORIDE 10 MG: 50 INJECTION, SOLUTION, CONCENTRATE INTRAMUSCULAR; INTRAVENOUS at 08:30:00

## 2022-05-20 RX ADMIN — DEXAMETHASONE SODIUM PHOSPHATE 8 MG: 4 MG/ML VIAL (ML) INJECTION at 08:11:00

## 2022-05-20 RX ADMIN — ROCURONIUM BROMIDE 45 MG: 10 INJECTION, SOLUTION INTRAVENOUS at 08:00:00

## 2022-05-20 RX ADMIN — ROCURONIUM BROMIDE 10 MG: 10 INJECTION, SOLUTION INTRAVENOUS at 09:05:00

## 2022-05-20 RX ADMIN — PHENYLEPHRINE HCL 100 MCG: 10 MG/ML VIAL (ML) INJECTION at 10:26:00

## 2022-05-20 RX ADMIN — GLYCOPYRROLATE 0.1 MG: 0.2 INJECTION, SOLUTION INTRAMUSCULAR; INTRAVENOUS at 07:39:00

## 2022-05-20 RX ADMIN — SODIUM CHLORIDE, SODIUM LACTATE, POTASSIUM CHLORIDE, CALCIUM CHLORIDE: 600; 310; 30; 20 INJECTION, SOLUTION INTRAVENOUS at 07:33:00

## 2022-05-20 RX ADMIN — LIDOCAINE HYDROCHLORIDE 50 MG: 10 INJECTION, SOLUTION EPIDURAL; INFILTRATION; INTRACAUDAL; PERINEURAL at 07:44:00

## 2022-05-20 RX ADMIN — ROCURONIUM BROMIDE 5 MG: 10 INJECTION, SOLUTION INTRAVENOUS at 07:44:00

## 2022-05-20 RX ADMIN — METRONIDAZOLE 500 MG: 500 INJECTION, SOLUTION INTRAVENOUS at 07:33:00

## 2022-05-20 RX ADMIN — ROCURONIUM BROMIDE 15 MG: 10 INJECTION, SOLUTION INTRAVENOUS at 11:01:00

## 2022-05-20 RX ADMIN — SODIUM CHLORIDE, SODIUM LACTATE, POTASSIUM CHLORIDE, CALCIUM CHLORIDE: 600; 310; 30; 20 INJECTION, SOLUTION INTRAVENOUS at 12:41:00

## 2022-05-20 RX ADMIN — SODIUM CHLORIDE, SODIUM LACTATE, POTASSIUM CHLORIDE, CALCIUM CHLORIDE: 600; 310; 30; 20 INJECTION, SOLUTION INTRAVENOUS at 10:26:00

## 2022-05-20 RX ADMIN — ROCURONIUM BROMIDE 10 MG: 10 INJECTION, SOLUTION INTRAVENOUS at 09:33:00

## 2022-05-20 NOTE — ANESTHESIA PROCEDURE NOTES
Airway  Date/Time: 5/20/2022 7:53 AM  Urgency: Elective      General Information and Staff    Patient location during procedure: OR  Anesthesiologist: Lisa Bautista MD  Resident/CRNA: Shahram Izquierdo CRNA  Performed: CRNA     Indications and Patient Condition  Indications for airway management: anesthesia  Sedation level: deep  Preoxygenated: yes  Patient position: sniffing  Mask difficulty assessment: 2 - vent by mask + OA or adjuvant +/- NMBA    Final Airway Details  Final airway type: endotracheal airway      Successful airway: ETT  Cuffed: yes   Successful intubation technique: direct laryngoscopy  Endotracheal tube insertion site: oral  Blade: GlideScope  Blade size: #4  ETT size (mm): 8.0    Cormack-Lehane Classification: grade IIA - partial view of glottis  Placement verified by: chest auscultation and capnometry   Measured from: lips  ETT to lips (cm): 23  Number of attempts at approach: 1    Additional Comments  Esophageal Intubation with MAC 4 on 1st attempt by this CRNA. Laryngoscopy with [place] 3 by Dr. Paramjit Whitaker on 2nd laryngoscopy but unable to obtain adequate view of cords. Atraumatic intubation on 2nd attempt by Dr. Desmond Rivera. VSS throughout induction and intubation.     Oral pharynx very red upon 1st laryngoscopy

## 2022-05-20 NOTE — OPERATIVE REPORT
[facility]   Urology Operative Note     Laith Balbuena Location: OR   CSN [id number] MRN [id number]   Admission Date 5/20/2022 Operation Date 5/20/2022   Service Urology Surgeon Dc Fish MD      Primary Surgeon: Dc Fish MD      Assistant(s): INNA Valles. Indication for Procedure: Patient is a pleasant 54-year-old male who was diagnosed with clinically localized favorable intermediate risk prostate cancer upon prostate biopsy performed January 2022 for evaluation of elevated PSA levels. Prostate MRI, bone scan, and CAT scan revealed organ confined disease. Treatment options were discussed with the patient including rationale, approach, benefits, risks, possible complications, and reasonable alternatives. He elects to proceed with a robotic assisted laparoscopic radical prostatectomy with pelvic lymph node dissection for definitive local therapy. We discussed risks including but not limited to medical and anesthetic complications, bleeding, infection, injury to surrounding organs or structures. He verbalized understanding and wishes to proceed. He was preoperatively medically optimized and cleared for surgery by his PCP. Preop urine culture was negative. Preoperative Diagnosis: Prostate cancer ([address] Pattricia Karla. Postoperative Diagnosis: Prostate cancer ([address] Pattricia Karla. Procedure Performed: XI Robotic assisted laparoscopic radical prostatectomy, bilateral pelvic lymph node dissection. Anesthesia: General endotracheal.     Surgical Findings: No modification of the intended routine. Nerve-sparing robotic prostatectomy occurred. All gross potential disease was fully resected at the end of the procedure. Description of Procedure: The patient was brought to the Operating Room. Patient identification was reconfirmed prior to anesthesia in the Operating Room, and intravenous antibiotics were administered for infection prophylaxis.  Bilateral lower extremity compression devices were placed for DVT prevention and general endotracheal anesthesia was obtained without difficulty. A standard universal time-out procedure was carried out after anesthesia induction and prior to starting the procedure. The patient was positioned supine on the operating table with the arms tucked and all pressure areas and bony prominences well padded. The patient was positioned to be stable in steep Trendelenburg (23 degrees) during surgery without excessive patient movement. The patient's lower abdomen and genitalia were shaved, prepped and draped in routine fashion, and the procedure was initiated. After standard draping, sterilely on the field a 22-Fr Ramos catheter was placed to the level of the bladder and the bladder emptied. The procedure was initiated by establishing a pneumoperitoneum by passage of a Veress needle at the level above the umbilicus in the midline into the intraperitoneal cavity, with appropriate needle position confirmed clinically. A pneumoperitoneum to 12 mm of mercury pressure was established without difficulty, with the patient showing no adverse hemodynamic or respiratory function change, and an initial trocar was placed in the midline superior to the umbilicus using a 8-mm robotic trocar. Through this initial trocar, intraabdominal laparoscopy was performed. INTRA-ABDOMINAL FINDINGS:  There were no significant intraabdominal abnormalities noted. No bleeding, bowel abnormality or injury, or other intraabdominal pathology recognized. Additional robotic and assistant trocars were subsequently placed in standard location across the lower abdomen, with safe placement and position visually confirmed. Once all 5 working ports were placed, the robot was brought to the field, docked and procedure was initiated.     INITIATION OF PROCEDURE:  The procedure was started using a 0-degree lens scope, using the monopolar scissors in the near right lateral arm, the [place] bipolar in the near left lateral arm, and the ProGrasp in the far left lateral arm. Initial dissection was performed posterior to the bladder to isolate and mobilize the vas deferens and seminal vesicles in the midline beneath and below the bladder at the level of the cul-de-sac. The peritoneum was incised horizontally to reveal the vasa, which were isolated for approximately 2 cm proximal to the ampulla and divided with cautery. This was performed bilaterally with careful dissection ensuring appropriate recognition of the vasa. The seminal vesicles were then identified, mobilized, dissected and freed bilaterally using blunt and sharp dissection. With the vasa and seminal vesicles isolated and freed bilaterally, they were grasped for anterior retraction and a plane was established in the midline between the vasa and seminal vesicles and the rectum, and this dissection was continued distally along the rectum below Denonvilliers fascia, between the rectum and prostate. Once adequate posterior midline dissection was achieved at the vasa and prostate base, attention was subsequently directed towards anterior dissection at the space of Retzius. The anterior peritoneum was incised lateral to the medial umbilical ligaments to enter into the extraperitoneal, extravesical space. The anterior peritoneum was incised laterally to the level of the internal ring, and subsequently dissection was continued into the pelvis parallel with the course of the vasa into the pelvis, incising the peritoneum in this manner to widely mobilize the superior extent of the bladder. With the space of Retzius entered, dissection was carried distally to the pubic symphysis through the avascular areolar tissues. The lateral dissection was sufficiently beyond the internal ring to leave a wide margin of anterior peritoneum to cover it if needed at the end of the procedure.  With the pubic symphysis identified, gentle blunt dissection was utilized to fully expose the endopelvic fascia, which was defatted, and excellent visualization was obtained with the general position of the prostate and transition to the bladder appreciated. The outline of the prostate and the bladder neck junction were seen beneath the endopelvic fascia, and the fascia was subsequently incised immediately lateral to the prostate with the underlying levator muscles visualized. The levator ani muscles were swept off the prostate with good exposure of the lateral extent of the prostate and the prostatic apex, but limited apical dissection was performed at this time. The Dorsal venous complex was addressed at this point. It was identified, mobilized and dissected, then stapled and divided with excellent hemostasis using a 30 mm purple load of the EndoGIA stapler. At the mid-prostate and near the bladder neck junction, an anterior cystotomy site was localized. The bladder was divided with cautery immediately proximal to the prostate with entry into the bladder first occurring in an anterior location, and once an anterior cystotomy was performed, the Ramos catheter balloon was released and the tip of the Ramos catheter was seen and brought through the cystotomy. Good visualization of the prostatic urethra through the cystotomy confirmed the appropriate location had been chosen for bladder neck transection, with the posterior bladder neck well visualized. Using the ProGrasp good anterior retraction of the the prostate was obtained with excellent visualization of the posterior bladder neck. No significant BPH or median prostate lobe was seen once the bladder was opened, and posterior dissection was continued per routine.     At this point, dissection was continued to circumferentially separate the bladder from the prostate, although the degree of dissection with cautery at the lateral aspects and lateral edges of the bladder neck junction was carefully limited to minimize potential thermal injury to the more laterally located neurovascular bundle. Dissection was carried through the posterior bladder neck full thickness in the midline below the prostate base, and this allowed exposure and entry into the posterior space, where the vasa had been isolated to start the surgery. The vasa and the seminal vesicles were brought through the midline and retracted anteriorly, giving exposure to the lateral edges of perivesical tissue and to the more lateral prostate pedicles - these components were subsequently addressed. PROSTATE PEDICLE DISSECTION:  The tissue surrounding the prostate was clinically normal and routine antegrade neurovascular bundle preservation was performed bilaterally. Dissection was performed on the right side and careful attention was made to strictly avoid use of any thermal energy in the course of the lateral dissection, to optimally maintain the integrity of the further laterally located neurovascular bundle. Sequential isolation and division of components of the prostatic pedicle was performed, with each small packet of tissue with vessels ligated with surgical clips, then sharply divided with the robotic angelika, again without use of any cautery. Dissection was performed on the left side and careful attention was made to strictly avoid use of any thermal energy in the course of the lateral dissection, to optimally maintain the integrity of the further laterally located neurovascular bundle. Sequential isolation and division of components of the prostatic pedicle was performed, with each small packet of tissue with vessels ligated with surgical clips, then sharply divided with the robotic angelika, again without use of any cautery.       On completion of bilateral lateral dissection, excellent exposure of the posterior and lateral edges of the prostate was achieved distally to the urethra itself, beyond the prostate apex, with consistent nerve preservation at this level towards the pelvic floor as along the lateral prostate margin. With the posterior and lateral aspects of the dissection completed, attention was turned anteriorly for dissection of the prostatic apex. The anterior periprostatic dissection was limited to maintain the integrity of the anterior endopelvic fascia, with the puboprostatic ligaments not specifically divided. The urethra was fully isolated circumferentially and the prostate apex margin was well visualized and confirmed to be completely included on the specimen. At this point, the remaining attachment of the specimen was at the urethra, which was sharply divided, and an excellent urethral stump was achieved. The specimen was inspected intra-corporally and showed no abnormality or sign of cancer extension. It was entrapped in a surgical specimen bag. LYMPH NODE DISSECTION:  Bilateral pelvic lymph node dissection was then undertaken using standard templates, with the external iliac vein being the lateral margin, Lane's ligament the distal margin, the obturator nerve and artery as the medial margin, and the proximal external iliac vein as the proximal margin. Identical templates for dissection were carried out bilaterally, first on the right and then on the left side, with specimens from the lymph node template grossly normal and removed from the field and submitted for permanent pathologic assessment. URETHRAL ANASTOMOSIS:  With the prostatectomy and lymph node dissection completed, attention was turned towards the urinary reconstruction and urethrovesical anastomosis. First, the bladder was brought lower into the pelvis using a 2-0 V-loc suture utilizing the Diego technique, approximating the cut edge of the posterior peritoneum behind the bladder to the rectourethralis fascia, and this made the actual anastomosis tension-free. Good hemostasis was confirmed.   Double-armed #2-0 Sharon Raja were placed about the six o'clock position of the posterior bladder neck and a continuous running anastomosis was constructed with each needle progressing in opposite directions towards the twelve o'clock point, where the ends were secured in a single knot. On completion of the anastomosis, there was a clear watertight closure, which was confirmed on irrigation and distension of the bladder with 120 cc of sterile water. A new 18-Fr Ramos catheter was placed and passed easily into the bladder across the anastomosis; it was set to straight drainage with 12 cc placed in the Ramos catheter balloon. The primary procedure was subsequently complete. COMPLETION OF PROCEDURE:  The 12 mm assistant port fascia was closed using an endoscopic fascial closure device and 0-vicryl suture under direct visualization. All trocars were removed under direct vision, with no evidence of hemorrhage or abnormality seen at any of the sites when visualized internally. Hemostasis in the pelvis was excellent on final inspection prior to trocar removal. The primary specimen was extracted through the supraumbilical site, which was enlarged to accommodate the size of the prostate. The supraumbilical incision was closed primarily with running #0 Vicryl suture to approximate the anterior fascia. The remaining trocar incisions did not require primary fascial closure given their size and location. The overlying soft tissue at all incisions was well irrigated and infused with 0.5% Marcaine for postoperative analgesia. All incisions were subsequently closed only at the skin with absorbable #4 Monocryl suture and reinforced with Exofin. The patient was extubated uneventfully in the Operating Room. He had stable vital signs throughout the surgery and was transferred to the PACU for routine monitoring having tolerated the procedure well without any immediate complications. All lap pad, instrument, needle, and sponge counts were correct x2 at the end of the procedure.     Estimated Blood Loss: Blood Output: 75 mL (5/20/2022 75:37 PM)     Complications: None. Specimens Removed:   1. Periprostatic fat. 2. Right pelvic lymph nodes for  3. Left pelvic lymph nodes. 4. Prostate, bilateral seminal vesicles and ampulla of vasa. Drains: An 25 Wolof urethral Ramos catheter to straight drainage. 12 mL of sterile water in the balloon. Patient's Condition at the End of the Procedure: Extubated and stable to PACU. Disposition: Monitor in PACU until patient meets transfer criteria. Routine postoperative care monitoring. Pain control. IV fluids. DVT prophylaxis. Incentive spirometry. Clear liquid diet. Out of bed and ambulate. Morning labs. Discharge planning. Follow-up pathology. I was present and either scrubbed or at the robotic console and performed the procedure in its entirety. At patient's request, his parents were updated following the procedure.     Victoria Briggs MD  5/20/2022

## 2022-05-20 NOTE — ANESTHESIA PROCEDURE NOTES
Peripheral IV  Date/Time: 5/20/2022 7:45 AM  Inserted by: Lisa Bautista MD    Placement  Needle size: 18 G  Laterality: right  Location: hand  Site prep: alcohol  Technique: anatomical landmarks  Attempts: 1

## 2022-05-20 NOTE — PLAN OF CARE
Patient on clear liquid diet, tolerating well. Dilaudid as needed and scheduled Tylenol for pain control. Ramos in place and draining well. Continues on IVF. Nicotine patch ordered. Ancef continued. Surgical lap sites are CDI. Patient using call light as needed for assistance. Problem: Patient Centered Care  Goal: Patient preferences are identified and integrated in the patient's plan of care  Description: Interventions:  - What would you like us to know as we care for you?  Lives alone, parents are very supportive  - Provide timely, complete, and accurate information to patient/family  - Incorporate patient and family knowledge, values, beliefs, and cultural backgrounds into the planning and delivery of care  - Encourage patient/family to participate in care and decision-making at the level they choose  - Honor patient and family perspectives and choices  Outcome: Progressing     Problem: Patient/Family Goals  Goal: Patient/Family Long Term Goal  Description: Patient's Long Term Goal:     Interventions:  -   - See additional Care Plan goals for specific interventions  Outcome: Progressing  Goal: Patient/Family Short Term Goal  Description: Patient's Short Term Goal:     Interventions:   -   - See additional Care Plan goals for specific interventions  Outcome: Progressing     Problem: PAIN - ADULT  Goal: Verbalizes/displays adequate comfort level or patient's stated pain goal  Description: INTERVENTIONS:  - Encourage pt to monitor pain and request assistance  - Assess pain using appropriate pain scale  - Administer analgesics based on type and severity of pain and evaluate response  - Implement non-pharmacological measures as appropriate and evaluate response  - Consider cultural and social influences on pain and pain management  - Manage/alleviate anxiety  - Utilize distraction and/or relaxation techniques  - Monitor for opioid side effects  - Notify MD/LIP if interventions unsuccessful or patient reports new pain  - Anticipate increased pain with activity and pre-medicate as appropriate  Outcome: Progressing     Problem: RISK FOR INFECTION - ADULT  Goal: Absence of fever/infection during anticipated neutropenic period  Description: INTERVENTIONS  - Monitor WBC  - Administer growth factors as ordered  - Implement neutropenic guidelines  Outcome: Progressing     Problem: SAFETY ADULT - FALL  Goal: Free from fall injury  Description: INTERVENTIONS:  - Assess pt frequently for physical needs  - Identify cognitive and physical deficits and behaviors that affect risk of falls.   - [place] fall precautions as indicated by assessment.  - Educate pt/family on patient safety including physical limitations  - Instruct pt to call for assistance with activity based on assessment  - Modify environment to reduce risk of injury  - Provide assistive devices as appropriate  - Consider OT/PT consult to assist with strengthening/mobility  - Encourage toileting schedule  Outcome: Progressing     Problem: DISCHARGE PLANNING  Goal: Discharge to home or other facility with appropriate resources  Description: INTERVENTIONS:  - Identify barriers to discharge w/pt and caregiver  - Include patient/family/discharge partner in discharge planning  - Arrange for needed discharge resources and transportation as appropriate  - Identify discharge learning needs (meds, wound care, etc)  - Arrange for interpreters to assist at discharge as needed  - Consider post-discharge preferences of patient/family/discharge partner  - Complete POLST form as appropriate  - Assess patient's ability to be responsible for managing their own health  - Refer to Case Management Department for coordinating discharge planning if the patient needs post-hospital services based on physician/LIP order or complex needs related to functional status, cognitive ability or social support system  Outcome: Progressing

## 2022-05-21 VITALS
RESPIRATION RATE: 18 BRPM | WEIGHT: 194.31 LBS | BODY MASS INDEX: 26.32 KG/M2 | SYSTOLIC BLOOD PRESSURE: 136 MMHG | HEIGHT: 72 IN | HEART RATE: 73 BPM | OXYGEN SATURATION: 97 % | TEMPERATURE: 99 F | DIASTOLIC BLOOD PRESSURE: 80 MMHG

## 2022-05-21 LAB
ANION GAP SERPL CALC-SCNC: 5 MMOL/L (ref 0–18)
BASOPHILS # BLD AUTO: 0.03 X10(3) UL (ref 0–0.2)
BASOPHILS NFR BLD AUTO: 0.2 %
BUN BLD-MCNC: 12 MG/DL (ref 7–18)
BUN/CREAT SERPL: 11.8 (ref 10–20)
CALCIUM BLD-MCNC: 8.3 MG/DL (ref 8.5–10.1)
CHLORIDE SERPL-SCNC: 107 MMOL/L (ref 98–112)
CO2 SERPL-SCNC: 29 MMOL/L (ref 21–32)
CREAT BLD-MCNC: 1.02 MG/DL
DEPRECATED RDW RBC AUTO: 45.7 FL (ref 35.1–46.3)
EOSINOPHIL # BLD AUTO: 0 X10(3) UL (ref 0–0.7)
EOSINOPHIL NFR BLD AUTO: 0 %
ERYTHROCYTE [DISTWIDTH] IN BLOOD BY AUTOMATED COUNT: 12.8 % (ref 11–15)
GLUCOSE BLD-MCNC: 110 MG/DL (ref 70–99)
HCT VFR BLD AUTO: 44.8 %
HGB BLD-MCNC: 15.2 G/DL
IMM GRANULOCYTES # BLD AUTO: 0.06 X10(3) UL (ref 0–1)
IMM GRANULOCYTES NFR BLD: 0.5 %
LYMPHOCYTES # BLD AUTO: 1.07 X10(3) UL (ref 1–4)
LYMPHOCYTES NFR BLD AUTO: 8.2 %
MAGNESIUM SERPL-MCNC: 2.2 MG/DL (ref 1.6–2.6)
MCH RBC QN AUTO: 33.6 PG (ref 26–34)
MCHC RBC AUTO-ENTMCNC: 33.9 G/DL (ref 31–37)
MCV RBC AUTO: 98.9 FL
MONOCYTES # BLD AUTO: 1.24 X10(3) UL (ref 0.1–1)
MONOCYTES NFR BLD AUTO: 9.5 %
NEUTROPHILS # BLD AUTO: 10.6 X10 (3) UL (ref 1.5–7.7)
NEUTROPHILS # BLD AUTO: 10.6 X10(3) UL (ref 1.5–7.7)
NEUTROPHILS NFR BLD AUTO: 81.6 %
OSMOLALITY SERPL CALC.SUM OF ELEC: 292 MOSM/KG (ref 275–295)
PLATELET # BLD AUTO: 237 10(3)UL (ref 150–450)
POTASSIUM SERPL-SCNC: 4.3 MMOL/L (ref 3.5–5.1)
RBC # BLD AUTO: 4.53 X10(6)UL
SODIUM SERPL-SCNC: 141 MMOL/L (ref 136–145)
WBC # BLD AUTO: 13 X10(3) UL (ref 4–11)

## 2022-05-21 PROCEDURE — 99214 OFFICE O/P EST MOD 30 MIN: CPT | Performed by: HOSPITALIST

## 2022-05-21 RX ORDER — HYDROCODONE BITARTRATE AND ACETAMINOPHEN 5; 325 MG/1; MG/1
1-2 TABLET ORAL EVERY 6 HOURS PRN
Qty: 30 TABLET | Refills: 0 | Status: SHIPPED | OUTPATIENT
Start: 2022-05-21

## 2022-05-21 RX ORDER — SENNA AND DOCUSATE SODIUM 50; 8.6 MG/1; MG/1
2 TABLET, FILM COATED ORAL NIGHTLY PRN
Qty: 14 TABLET | Refills: 0 | Status: SHIPPED | OUTPATIENT
Start: 2022-05-21

## 2022-05-21 NOTE — PLAN OF CARE
Patient is resting is resting in bed, A&O x4, VSS, and pain is being managed with tylenol schedule and prn oxycodone. Tolerating clear liquid diet and no complaints of nausea. Up independently and walking the halls. Ramos in place and voiding. IVF and antibiotics infusing overnight. Bed is in lowest position, safety precautions are in place, and call light is within reach.      Problem: Patient Centered Care  Goal: Patient preferences are identified and integrated in the patient's plan of care  Description: Interventions:  - What would you like us to know as we care for you?   - Provide timely, complete, and accurate information to patient/family  - Incorporate patient and family knowledge, values, beliefs, and cultural backgrounds into the planning and delivery of care  - Encourage patient/family to participate in care and decision-making at the level they choose  - Honor patient and family perspectives and choices  Outcome: Progressing     Problem: Patient/Family Goals  Goal: Patient/Family Long Term Goal  Description: Patient's Long Term Goal:     Interventions:  -   - See additional Care Plan goals for specific interventions  Outcome: Progressing  Goal: Patient/Family Short Term Goal  Description: Patient's Short Term Goal:     Interventions:   -   - See additional Care Plan goals for specific interventions  Outcome: Progressing     Problem: PAIN - ADULT  Goal: Verbalizes/displays adequate comfort level or patient's stated pain goal  Description: INTERVENTIONS:  - Encourage pt to monitor pain and request assistance  - Assess pain using appropriate pain scale  - Administer analgesics based on type and severity of pain and evaluate response  - Implement non-pharmacological measures as appropriate and evaluate response  - Consider cultural and social influences on pain and pain management  - Manage/alleviate anxiety  - Utilize distraction and/or relaxation techniques  - Monitor for opioid side effects  - Notify MD/LIP if interventions unsuccessful or patient reports new pain  - Anticipate increased pain with activity and pre-medicate as appropriate  Outcome: Progressing     Problem: RISK FOR INFECTION - ADULT  Goal: Absence of fever/infection during anticipated neutropenic period  Description: INTERVENTIONS  - Monitor WBC  - Administer growth factors as ordered  - Implement neutropenic guidelines  Outcome: Progressing     Problem: SAFETY ADULT - FALL  Goal: Free from fall injury  Description: INTERVENTIONS:  - Assess pt frequently for physical needs  - Identify cognitive and physical deficits and behaviors that affect risk of falls.   - [place] fall precautions as indicated by assessment.  - Educate pt/family on patient safety including physical limitations  - Instruct pt to call for assistance with activity based on assessment  - Modify environment to reduce risk of injury  - Provide assistive devices as appropriate  - Consider OT/PT consult to assist with strengthening/mobility  - Encourage toileting schedule  Outcome: Progressing     Problem: DISCHARGE PLANNING  Goal: Discharge to home or other facility with appropriate resources  Description: INTERVENTIONS:  - Identify barriers to discharge w/pt and caregiver  - Include patient/family/discharge partner in discharge planning  - Arrange for needed discharge resources and transportation as appropriate  - Identify discharge learning needs (meds, wound care, etc)  - Arrange for interpreters to assist at discharge as needed  - Consider post-discharge preferences of patient/family/discharge partner  - Complete POLST form as appropriate  - Assess patient's ability to be responsible for managing their own health  - Refer to Case Management Department for coordinating discharge planning if the patient needs post-hospital services based on physician/LIP order or complex needs related to functional status, cognitive ability or social support system  Outcome: Progressing

## 2022-05-21 NOTE — PLAN OF CARE
Patient alert and oriented x4. Up independently. Walking in halls frequently throughout shift. Advanced to general diet this AM. Abdominal incisions clean/dry/intact. Ramos in place, draining freely. Patient educated on Ramos care upon discharge. Pain controlled with scheduled Tylenol and PRN OxyIR. Call light in reach. Frequent rounding performed. Problem: Patient Centered Care  Goal: Patient preferences are identified and integrated in the patient's plan of care  Description: Interventions:  - What would you like us to know as we care for you?  I have never been in the hospital before  - Provide timely, complete, and accurate information to patient/family  - Incorporate patient and family knowledge, values, beliefs, and cultural backgrounds into the planning and delivery of care  - Encourage patient/family to participate in care and decision-making at the level they choose  - Honor patient and family perspectives and choices  Outcome: Progressing     Problem: Patient/Family Goals  Goal: Patient/Family Long Term Goal  Description: Patient's Long Term Goal: to return home    Interventions:  - See additional Care Plan goals for specific interventions  Outcome: Progressing  Goal: Patient/Family Short Term Goal  Description: Patient's Short Term Goal: to control my pain    Interventions:   - See additional Care Plan goals for specific interventions  Outcome: Progressing     Problem: PAIN - ADULT  Goal: Verbalizes/displays adequate comfort level or patient's stated pain goal  Description: INTERVENTIONS:  - Encourage pt to monitor pain and request assistance  - Assess pain using appropriate pain scale  - Administer analgesics based on type and severity of pain and evaluate response  - Implement non-pharmacological measures as appropriate and evaluate response  - Consider cultural and social influences on pain and pain management  - Manage/alleviate anxiety  - Utilize distraction and/or relaxation techniques  - Monitor for opioid side effects  - Notify MD/LIP if interventions unsuccessful or patient reports new pain  - Anticipate increased pain with activity and pre-medicate as appropriate  Outcome: Progressing     Problem: RISK FOR INFECTION - ADULT  Goal: Absence of fever/infection during anticipated neutropenic period  Description: INTERVENTIONS  - Monitor WBC  - Administer growth factors as ordered  - Implement neutropenic guidelines  Outcome: Progressing     Problem: SAFETY ADULT - FALL  Goal: Free from fall injury  Description: INTERVENTIONS:  - Assess pt frequently for physical needs  - Identify cognitive and physical deficits and behaviors that affect risk of falls.   - [place] fall precautions as indicated by assessment.  - Educate pt/family on patient safety including physical limitations  - Instruct pt to call for assistance with activity based on assessment  - Modify environment to reduce risk of injury  - Provide assistive devices as appropriate  - Consider OT/PT consult to assist with strengthening/mobility  - Encourage toileting schedule  Outcome: Progressing     Problem: DISCHARGE PLANNING  Goal: Discharge to home or other facility with appropriate resources  Description: INTERVENTIONS:  - Identify barriers to discharge w/pt and caregiver  - Include patient/family/discharge partner in discharge planning  - Arrange for needed discharge resources and transportation as appropriate  - Identify discharge learning needs (meds, wound care, etc)  - Arrange for interpreters to assist at discharge as needed  - Consider post-discharge preferences of patient/family/discharge partner  - Complete POLST form as appropriate  - Assess patient's ability to be responsible for managing their own health  - Refer to Case Management Department for coordinating discharge planning if the patient needs post-hospital services based on physician/LIP order or complex needs related to functional status, cognitive ability or social support system  Outcome: Progressing

## 2022-05-24 ENCOUNTER — TELEPHONE (OUTPATIENT)
Dept: SURGERY | Facility: CLINIC | Age: 52
End: 2022-05-24

## 2022-05-24 NOTE — TELEPHONE ENCOUNTER
Pt called I offered carlos with [facility] 5/31/22 at noon for post op ventura removal, pt is aware to also keep carlos with [facility] 6/2/22.

## 2022-05-31 ENCOUNTER — OFFICE VISIT (OUTPATIENT)
Dept: SURGERY | Facility: CLINIC | Age: 52
End: 2022-05-31
Payer: COMMERCIAL

## 2022-05-31 DIAGNOSIS — C61 PROSTATE CANCER (HCC): Primary | ICD-10-CM

## 2022-05-31 PROCEDURE — 99024 POSTOP FOLLOW-UP VISIT: CPT | Performed by: NURSE PRACTITIONER

## 2022-06-02 ENCOUNTER — OFFICE VISIT (OUTPATIENT)
Dept: SURGERY | Facility: CLINIC | Age: 52
End: 2022-06-02
Payer: COMMERCIAL

## 2022-06-02 ENCOUNTER — TELEPHONE (OUTPATIENT)
Dept: SURGERY | Facility: CLINIC | Age: 52
End: 2022-06-02

## 2022-06-02 DIAGNOSIS — C61 PROSTATE CANCER (HCC): Primary | ICD-10-CM

## 2022-06-02 PROCEDURE — 99024 POSTOP FOLLOW-UP VISIT: CPT | Performed by: UROLOGY

## 2022-06-03 NOTE — TELEPHONE ENCOUNTER
Dr. Sparkle Dallas,     Please sign off on form: Disab Udpate  -Highlight the patient and hit \"Chart\" button. -In Chart Review, w/in the Encounter tab - click 1 time on the Telephone call encounter for 4/27/22 Scroll down the telephone encounter.  -Click \"scan on\" blue Hyperlink under \"Media\" heading for Disab Update Dr Sparkle Dallas 6/3/22 w/in the telephone enc.  -Click on Acknowledge button at the bottom right corner and left-click onto image, signature stamp appears and drag signature to Provider signature line. Stamp will turn blue. Close window.      Thank you,    Beth Rhodes

## 2022-06-07 NOTE — TELEPHONE ENCOUNTER
Dr. Linsey Booth,    **2nd request**     Please sign off on form: Disab Update  -Highlight the patient and hit \"Chart\" button. -In Chart Review, w/in the Encounter tab - click 1 time on the Telephone call encounter for 4/27/22 Scroll down the telephone encounter.  -Click \"scan on\" blue Hyperlink under \"Media\" heading for Disab Update Dr Linsey Booth 6/3/22 w/in the telephone enc.  -Click on Acknowledge button at the bottom right corner and left-click onto image, signature stamp appears and drag signature to Provider signature line. Stamp will turn blue. Close window.      Thank you,    Yasir Vo

## 2022-06-10 NOTE — TELEPHONE ENCOUNTER
Dr. Oscar Roy,     Please sign off on form: Disab/FMla   -Highlight the patient and hit \"Chart\" button. -In Chart Review, w/in the Encounter tab - click 1 time on the Telephone call encounter for 4/27/22 Scroll down the telephone encounter.  -Click \"scan on\" blue Hyperlink under \"Media\" heading for Seema Stephie 6/10/22 w/in the telephone enc.  -Click on Acknowledge button at the bottom right corner and left-click onto image, signature stamp appears and drag signature to Provider signature line. Stamp will turn blue. Close window. Thank you,    Larissa Winchester.

## 2022-07-06 ENCOUNTER — LAB ENCOUNTER (OUTPATIENT)
Dept: LAB | Facility: HOSPITAL | Age: 52
End: 2022-07-06
Attending: UROLOGY
Payer: COMMERCIAL

## 2022-07-06 ENCOUNTER — OFFICE VISIT (OUTPATIENT)
Dept: SURGERY | Facility: CLINIC | Age: 52
End: 2022-07-06
Payer: COMMERCIAL

## 2022-07-06 DIAGNOSIS — C61 PROSTATE CANCER (HCC): Primary | ICD-10-CM

## 2022-07-06 DIAGNOSIS — C61 PROSTATE CANCER (HCC): ICD-10-CM

## 2022-07-06 DIAGNOSIS — N52.31 ERECTILE DYSFUNCTION AFTER RADICAL PROSTATECTOMY: ICD-10-CM

## 2022-07-06 DIAGNOSIS — N39.3 MALE STRESS INCONTINENCE: ICD-10-CM

## 2022-07-06 LAB — PSA SERPL-MCNC: <0.01 NG/ML (ref ?–4)

## 2022-07-06 PROCEDURE — 84153 ASSAY OF PSA TOTAL: CPT

## 2022-07-06 PROCEDURE — 36415 COLL VENOUS BLD VENIPUNCTURE: CPT

## 2022-07-06 RX ORDER — SILDENAFIL 50 MG/1
TABLET, FILM COATED ORAL
Qty: 6 TABLET | Refills: 5 | Status: SHIPPED | OUTPATIENT
Start: 2022-07-06

## 2022-07-17 ENCOUNTER — IMMUNIZATION (OUTPATIENT)
Dept: LAB | Age: 52
End: 2022-07-17
Attending: EMERGENCY MEDICINE
Payer: COMMERCIAL

## 2022-07-17 DIAGNOSIS — Z23 NEED FOR VACCINATION: Primary | ICD-10-CM

## 2022-07-17 PROCEDURE — 0094A SARSCOV2 VAC 50MCG/0.5ML IM: CPT

## 2022-07-25 ENCOUNTER — TELEPHONE (OUTPATIENT)
Dept: INTERNAL MEDICINE CLINIC | Facility: CLINIC | Age: 52
End: 2022-07-25

## 2022-07-25 NOTE — TELEPHONE ENCOUNTER
Patient states got 1st Shingrix vaccine on 7/16/22 at [facility] on [address] in [facility].

## 2022-08-09 DIAGNOSIS — I10 ESSENTIAL HYPERTENSION: ICD-10-CM

## 2022-08-09 RX ORDER — LOSARTAN POTASSIUM 100 MG/1
TABLET ORAL
Qty: 90 TABLET | Refills: 1 | Status: SHIPPED | OUTPATIENT
Start: 2022-08-09

## 2022-08-09 NOTE — TELEPHONE ENCOUNTER
Received fax from [place] requesting a refill. Please review pended script and sign if appropriate.

## 2022-10-06 DIAGNOSIS — I10 ESSENTIAL HYPERTENSION: ICD-10-CM

## 2022-10-06 RX ORDER — AMLODIPINE BESYLATE 5 MG/1
5 TABLET ORAL DAILY
Qty: 90 TABLET | Refills: 1 | Status: SHIPPED | OUTPATIENT
Start: 2022-10-06 | End: 2023-02-14

## 2022-10-06 NOTE — TELEPHONE ENCOUNTER
Refill passed per [address] [place] protocol.     Requested Prescriptions   Pending Prescriptions Disp Refills    AMLODIPINE 5 MG Oral Tab [Pharmacy Med Name: AMLODIPINE BESYLATE 5MG TABLETS] 90 tablet 1     Sig: TAKE 1 TABLET(5 MG) BY MOUTH DAILY        Hypertensive Medications Protocol Passed - 10/6/2022  9:09 AM        Passed - In person appointment in the past 12 or next 3 months       Recent Outpatient Visits              3 months ago Prostate cancer [facility])    [facility]    Office Visit    4 months ago Prostate cancer [facility])    [facility]    Office Visit    4 months ago Prostate cancer [facility])    [facility] De[facility] Webupo Mady, Sheria Boast, APRN    Office Visit    4 months ago Essential hypertension    [address] Rana Bells, MD    Office Visit    5 months ago Pre-op exam    [address] Rana Bells, MD    Office Visit     Future Appointments         Provider Department Appt Notes    In 3 weeks [address]     In 3 months Rios Walters MD [facility], [place] [employer] - Last BP reading less than 140/90     BP Readings from Last 1 Encounters:  05/21/22 : 136/80                Passed - CMP or BMP in past 6 months     Recent Results (from the past 4392 hour(s))   Basic Metabolic Panel (8)    Collection Time: 05/21/22  5:39 AM   Result Value Ref Range    Glucose 110 (H) 70 - 99 mg/dL    Sodium 141 136 - 145 mmol/L    Potassium 4.3 3.5 - 5.1 mmol/L    Chloride 107 98 - 112 mmol/L    CO2 29.0 21.0 - 32.0 mmol/L    Anion Gap 5 0 - 18 mmol/L    BUN 12 7 - 18 mg/dL    Creatinine 1.02 0.70 - 1.30 mg/dL    BUN/CREA Ratio 11.8 10.0 - 20.0    Calcium, Total 8.3 (L) 8.5 - 10.1 mg/dL    Calculated Osmolality 292 275 - 295 mOsm/kg    GFR, Non- 85 >=60    GFR, -American 98 >=60     *Note: Due to a large number of results and/or encounters for the requested time period, some results have not been displayed. A complete set of results can be found in Results Review.                  Passed - In person appointment or virtual visit in the past 6 months       Recent Outpatient Visits              3 months ago [facility])    [facility], [address], Cory Sharma MD    Office Visit    4 months ago [facility])    [facility], [address], Cory Sharma MD    Office Visit    4 months ago [facility])    [facility], [address], [employer], Benay Myah, APRN    Office Visit    4 months ago Essential hypertension    [address], [address], Rabia Soto MD    Office Visit    5 months ago Pre-op exam    Kika Gonzalez, Rabia Soto MD    Office Visit     Future Appointments         Provider Department Appt Notes    In 3 weeks [address]     In 3 months Ruby Healy MD [facility], [address], [address] or GFRNAA > 50     GFR Evaluation  GFRNAA: 85 , resulted on 5/21/2022                  Recent Outpatient Visits              3 months ago [facility])    [facility], [address], Cory Sharma MD    Office Visit    4 months ago [facility])    [facility], [address], Cory Sharma MD    Office Visit    4 months ago [facility])    [facility], [address], [employer], Benay Myah, APRN    Office Visit    4 months ago Essential hypertension    [facility], [facility], [address], Julio Hernandez MD    Office Visit    5 months ago Pre-op exam    [facility], Julio Hernandez MD    Office Visit            Future Appointments         Provider Department Appt Notes    In 3 weeks [facility] [address]     In 3 months Yamil Santiago MD [facility], [facility]

## 2022-10-23 ENCOUNTER — IMMUNIZATION (OUTPATIENT)
Dept: LAB | Age: 52
End: 2022-10-23
Attending: EMERGENCY MEDICINE
Payer: COMMERCIAL

## 2022-10-23 DIAGNOSIS — Z23 NEED FOR VACCINATION: Primary | ICD-10-CM

## 2022-10-23 PROCEDURE — 0134A SARSCOV2 VAC BVL 50MCG/0.5ML: CPT

## 2023-01-09 ENCOUNTER — OFFICE VISIT (OUTPATIENT)
Dept: SURGERY | Facility: CLINIC | Age: 53
End: 2023-01-09
Payer: COMMERCIAL

## 2023-01-09 ENCOUNTER — LAB ENCOUNTER (OUTPATIENT)
Dept: LAB | Facility: HOSPITAL | Age: 53
End: 2023-01-09
Attending: UROLOGY
Payer: COMMERCIAL

## 2023-01-09 DIAGNOSIS — C61 PROSTATE CANCER (HCC): ICD-10-CM

## 2023-01-09 DIAGNOSIS — C61 PROSTATE CANCER (HCC): Primary | ICD-10-CM

## 2023-01-09 LAB — PSA SERPL-MCNC: <0.01 NG/ML (ref ?–4)

## 2023-01-09 PROCEDURE — 36415 COLL VENOUS BLD VENIPUNCTURE: CPT

## 2023-01-09 PROCEDURE — 99213 OFFICE O/P EST LOW 20 MIN: CPT | Performed by: UROLOGY

## 2023-01-09 PROCEDURE — 84153 ASSAY OF PSA TOTAL: CPT

## 2023-02-14 DIAGNOSIS — I10 ESSENTIAL HYPERTENSION: ICD-10-CM

## 2023-02-14 RX ORDER — AMLODIPINE BESYLATE 5 MG/1
5 TABLET ORAL DAILY
Qty: 90 TABLET | Refills: 0 | Status: SHIPPED | OUTPATIENT
Start: 2023-02-14

## 2023-02-14 RX ORDER — LOSARTAN POTASSIUM 100 MG/1
TABLET ORAL
Qty: 90 TABLET | Refills: 0 | Status: SHIPPED | OUTPATIENT
Start: 2023-02-14

## 2023-02-14 NOTE — TELEPHONE ENCOUNTER
Patient needs refills on both medications, is almost out of both     losartan 100 MG Oral Tab  amLODIPine 5 MG Oral Tab

## 2023-02-15 RX ORDER — LOSARTAN POTASSIUM 100 MG/1
TABLET ORAL
Qty: 90 TABLET | Refills: 0 | Status: SHIPPED | OUTPATIENT
Start: 2023-02-15

## 2023-02-15 RX ORDER — AMLODIPINE BESYLATE 5 MG/1
TABLET ORAL
Qty: 90 TABLET | Refills: 0 | Status: SHIPPED | OUTPATIENT
Start: 2023-02-15

## 2023-02-21 ENCOUNTER — OFFICE VISIT (OUTPATIENT)
Facility: CLINIC | Age: 53
End: 2023-02-21

## 2023-02-21 VITALS
DIASTOLIC BLOOD PRESSURE: 70 MMHG | RESPIRATION RATE: 20 BRPM | WEIGHT: 194 LBS | BODY MASS INDEX: 26.28 KG/M2 | HEIGHT: 72 IN | OXYGEN SATURATION: 96 % | SYSTOLIC BLOOD PRESSURE: 130 MMHG | HEART RATE: 95 BPM

## 2023-02-21 DIAGNOSIS — I10 ESSENTIAL HYPERTENSION: Primary | ICD-10-CM

## 2023-02-21 DIAGNOSIS — F17.200 TOBACCO USE DISORDER: ICD-10-CM

## 2023-02-21 DIAGNOSIS — Z00.00 ROUTINE HEALTH MAINTENANCE: ICD-10-CM

## 2023-02-21 DIAGNOSIS — Z12.11 COLON CANCER SCREENING: ICD-10-CM

## 2023-02-21 DIAGNOSIS — C61 PROSTATE CANCER (HCC): ICD-10-CM

## 2023-02-21 PROBLEM — Z01.818 PRE-OP EXAM: Status: RESOLVED | Noted: 2022-04-26 | Resolved: 2023-02-21

## 2023-02-21 PROCEDURE — 3075F SYST BP GE 130 - 139MM HG: CPT | Performed by: INTERNAL MEDICINE

## 2023-02-21 PROCEDURE — 3078F DIAST BP <80 MM HG: CPT | Performed by: INTERNAL MEDICINE

## 2023-02-21 PROCEDURE — 3008F BODY MASS INDEX DOCD: CPT | Performed by: INTERNAL MEDICINE

## 2023-02-21 PROCEDURE — 99214 OFFICE O/P EST MOD 30 MIN: CPT | Performed by: INTERNAL MEDICINE

## 2023-02-21 PROCEDURE — 90677 PCV20 VACCINE IM: CPT | Performed by: INTERNAL MEDICINE

## 2023-02-21 PROCEDURE — 90471 IMMUNIZATION ADMIN: CPT | Performed by: INTERNAL MEDICINE

## 2023-02-21 NOTE — PATIENT INSTRUCTIONS
Please schedule your next appointment in May for a physical.  Do fasting labs 2-4 days before that appointment. Fast for 12 hours. Water and meds are okay.    Do not take vitamins or supplements for 3 days prior to the blood test.

## 2023-03-07 ENCOUNTER — TELEPHONE (OUTPATIENT)
Dept: INTERNAL MEDICINE CLINIC | Facility: CLINIC | Age: 53
End: 2023-03-07

## 2023-03-07 NOTE — TELEPHONE ENCOUNTER
Patient is calling and states that he wants his record to be updated. He informed me that he received his T-Dap vaccine on 3/3/2023 at [place].

## 2023-03-07 NOTE — TELEPHONE ENCOUNTER
Call changed to immunization/injection. Updated pts chart with TDAP injection. No further actions needed.

## 2023-07-08 ENCOUNTER — LAB ENCOUNTER (OUTPATIENT)
Dept: LAB | Facility: HOSPITAL | Age: 53
End: 2023-07-08
Attending: INTERNAL MEDICINE
Payer: COMMERCIAL

## 2023-07-08 DIAGNOSIS — C61 PROSTATE CANCER (HCC): ICD-10-CM

## 2023-07-08 DIAGNOSIS — Z00.00 ROUTINE HEALTH MAINTENANCE: ICD-10-CM

## 2023-07-08 LAB
ALBUMIN SERPL-MCNC: 3.7 G/DL (ref 3.4–5)
ALBUMIN/GLOB SERPL: 1.1 {RATIO} (ref 1–2)
ALP LIVER SERPL-CCNC: 66 U/L
ALT SERPL-CCNC: 20 U/L
ANION GAP SERPL CALC-SCNC: 7 MMOL/L (ref 0–18)
AST SERPL-CCNC: 15 U/L (ref 15–37)
BASOPHILS # BLD AUTO: 0.08 X10(3) UL (ref 0–0.2)
BASOPHILS NFR BLD AUTO: 1 %
BILIRUB SERPL-MCNC: 0.9 MG/DL (ref 0.1–2)
BUN BLD-MCNC: 12 MG/DL (ref 7–18)
BUN/CREAT SERPL: 13 (ref 10–20)
CALCIUM BLD-MCNC: 8.9 MG/DL (ref 8.5–10.1)
CHLORIDE SERPL-SCNC: 108 MMOL/L (ref 98–112)
CHOLEST SERPL-MCNC: 189 MG/DL (ref ?–200)
CO2 SERPL-SCNC: 28 MMOL/L (ref 21–32)
CREAT BLD-MCNC: 0.92 MG/DL
DEPRECATED RDW RBC AUTO: 46 FL (ref 35.1–46.3)
EOSINOPHIL # BLD AUTO: 0.16 X10(3) UL (ref 0–0.7)
EOSINOPHIL NFR BLD AUTO: 1.9 %
ERYTHROCYTE [DISTWIDTH] IN BLOOD BY AUTOMATED COUNT: 12.8 % (ref 11–15)
FASTING PATIENT LIPID ANSWER: YES
FASTING STATUS PATIENT QL REPORTED: YES
GFR SERPLBLD BASED ON 1.73 SQ M-ARVRAT: 99 ML/MIN/1.73M2 (ref 60–?)
GLOBULIN PLAS-MCNC: 3.4 G/DL (ref 2.8–4.4)
GLUCOSE BLD-MCNC: 104 MG/DL (ref 70–99)
HCT VFR BLD AUTO: 48.7 %
HDLC SERPL-MCNC: 50 MG/DL (ref 40–59)
HGB BLD-MCNC: 17.1 G/DL
IMM GRANULOCYTES # BLD AUTO: 0.03 X10(3) UL (ref 0–1)
IMM GRANULOCYTES NFR BLD: 0.4 %
LDLC SERPL CALC-MCNC: 122 MG/DL (ref ?–100)
LYMPHOCYTES # BLD AUTO: 2.41 X10(3) UL (ref 1–4)
LYMPHOCYTES NFR BLD AUTO: 28.8 %
MCH RBC QN AUTO: 33.9 PG (ref 26–34)
MCHC RBC AUTO-ENTMCNC: 35.1 G/DL (ref 31–37)
MCV RBC AUTO: 96.6 FL
MONOCYTES # BLD AUTO: 0.96 X10(3) UL (ref 0.1–1)
MONOCYTES NFR BLD AUTO: 11.5 %
NEUTROPHILS # BLD AUTO: 4.73 X10 (3) UL (ref 1.5–7.7)
NEUTROPHILS # BLD AUTO: 4.73 X10(3) UL (ref 1.5–7.7)
NEUTROPHILS NFR BLD AUTO: 56.4 %
NONHDLC SERPL-MCNC: 139 MG/DL (ref ?–130)
OSMOLALITY SERPL CALC.SUM OF ELEC: 296 MOSM/KG (ref 275–295)
PLATELET # BLD AUTO: 321 10(3)UL (ref 150–450)
POTASSIUM SERPL-SCNC: 3.9 MMOL/L (ref 3.5–5.1)
PROT SERPL-MCNC: 7.1 G/DL (ref 6.4–8.2)
PSA SERPL-MCNC: <0.01 NG/ML (ref ?–4)
RBC # BLD AUTO: 5.04 X10(6)UL
SODIUM SERPL-SCNC: 143 MMOL/L (ref 136–145)
TRIGL SERPL-MCNC: 92 MG/DL (ref 30–149)
VLDLC SERPL CALC-MCNC: 16 MG/DL (ref 0–30)
WBC # BLD AUTO: 8.4 X10(3) UL (ref 4–11)

## 2023-07-08 PROCEDURE — 80053 COMPREHEN METABOLIC PANEL: CPT

## 2023-07-08 PROCEDURE — 84153 ASSAY OF PSA TOTAL: CPT

## 2023-07-08 PROCEDURE — 85025 COMPLETE CBC W/AUTO DIFF WBC: CPT

## 2023-07-08 PROCEDURE — 36415 COLL VENOUS BLD VENIPUNCTURE: CPT

## 2023-07-08 PROCEDURE — 80061 LIPID PANEL: CPT

## 2023-07-10 ENCOUNTER — OFFICE VISIT (OUTPATIENT)
Dept: SURGERY | Facility: CLINIC | Age: 53
End: 2023-07-10

## 2023-07-10 DIAGNOSIS — C61 PROSTATE CANCER (HCC): Primary | ICD-10-CM

## 2023-07-10 PROCEDURE — 99213 OFFICE O/P EST LOW 20 MIN: CPT | Performed by: UROLOGY

## 2023-07-10 NOTE — PROGRESS NOTES
[facility]rology  Follow-Up Visit    HPI: Toña Sandoval is a 48year old male presents for a follow up visit. Patient was last seen on 1/9/23. Here by himself. INTERVAL HISTORY: History of clinically localized favorable intermediate risk prostate cancer initially diagnosed January 2022 upon prostate biopsy. Patient underwent a robotic assisted radical prostatectomy with pelvic lymph node dissection on 5/20/2022. Final pathology revealed grade group 2 prostate cancer, confined to the prostate, involving 20-25% of the prostatic parenchyma. Extensive intraductal carcinoma present. + PNI. No MARQUITA, SVI, or LVI. Focal positive surgical margin (right posterior). 4 lymph nodes negative for malignancy. Final pathologic stage pT2 N0 R1. Post prostatectomy PSA level 7/2023 remains undetectable. Excellent recovery of urinary control following surgery. Uses 1 depend for safety per 24 hours. No leakage at night. Previously referred to pelvic floor PT however patient did not schedule. He is not sexually active. Previously provided a prescription for Viagra however patient did not try. Otherwise doing well. No fevers or chills, chest pain or shortness of breath, nausea or vomiting. Bowel habits are back to normal.     No gross hematuria or dysuria. Excellent urinary stream.      1. Clinically Localized Favorable Intermediate Risk Prostate Cancer (pT2 pN0 R1 cM0)  Patient reports family history of prostate cancer in his father and paternal uncle. Both are alive and doing well from a prostate cancer standpoint. Both underwent radical prostatectomy for definitive local therapy. Patient had an elevated screening PSA level up to 9.47 ng/mL from 10/2021.   He was seen by Dr. Gloria Cabrera who performed a TRUS-guided prostate biopsy on 1/21/2022 which revealed:  - Prostate volume on TRUS: 30 g.  - Grade group 2 ([place] 3+4=7) PCa in 5/12 cores (RB, LM, LA), up to 20% involvement, no PNI.  - ASAP in 1/12 cores, right mid. Patient completed a staging nuclear medicine bone scan and CT abdomen and pelvis which did not reveal any evidence of metastatic disease. Incidentally on his CT scan he was found to have a 4 mm obstructing proximal left ureteral stone with mild hydroureteronephrosis. Findings consistent with a chronic bladder outlet obstruction including diffuse bladder wall thickening/trabeculation with a small right lateral bladder wall diverticulum. Patient completely asymptomatic from a kidney stone standpoint. No previous history of nephrolithiasis. He had a follow-up KUB film on 3/1/2022 which revealed that his left ureteral stone has migrated distally near the UVJ. He again denies any flank pain or associated symptoms. He has not noticed passing the stone. Patient currently not sexually active however he denies any difficulty obtaining and maintaining erections. His IPSS score is 0 with a quality-of-life score of 1. No gross hematuria or dysuria, fevers or chills, chest pain or shortness of breath, nausea or vomiting.    - 5/20/22: Patient underwent a robotic assisted radical prostatectomy with pelvic lymph node dissection. Final pathology revealed grade group 2 prostate cancer, confined to the prostate, involving 20-25% of the prostatic parenchyma. Extensive intraductal carcinoma present. + PNI. No MARQUITA, SVI, or LVI. Focal positive surgical margin (right posterior). 4 lymph nodes negative for malignancy. Final pathologic stage pT2 N0 R1 (stage IIb). - 7/2022 F/U: PSA <0.01. Down to 1 depend daily. Dry at night. No erections. Referral for pelvic floor PT. Start rehab with Viagra 25 to 50 mg twice weekly. Follow-up in 6 months. - 1/2023 F/U: PSA undetectable. 1 depend daily. Dry at night. No erections. Did not try Viagra. - 1/2023 F/U: PSA undetectable. 1 depend daily. Dry at night. No erections. Did not try Viagra.          PAST MEDICAL HISTORY: HTN, history of pneumonia, prostate cancer 1/2022. PAST SURGICAL HISTORY: pilonidal cyst surgery 2006, robotic radical prostatectomy 5/2022. Galen Eduardo SOCIAL HISTORY:  and has 3 children. Active smoker of 1 pack/day for 25 years. He drinks alcohol socially. He works as a  with [employer]. Reviewed past medical, surgical, family, and social history. Reviewed med list and allergies. REVIEW OF SYSTEMS:  Pertinent positives and negatives per HPI. A 10-point ROS was performed and is otherwise negative. EXAM:  There were no vitals taken for this visit. Physical Exam  Constitutional:       Appearance: He is well-developed. HENT:      Head: Normocephalic. Eyes:      General: No scleral icterus. Cardiovascular:      Rate and Rhythm: Normal rate. Pulmonary:      Effort: Pulmonary effort is normal.   Skin:     General: Skin is warm and dry. Neurological:      Mental Status: He is alert and oriented to person, place, and time. Psychiatric:         Mood and Affect: Mood normal.         Behavior: Behavior normal.       PATHOLOGY:    Pathology                                Case: [id number]    Provider:  Mitali Zabala MD       Collected:           05/20/2022     A. Periprostatic fat:  Benign adipose tissue with no evidence of involvement by adenocarcinoma. B. Left pelvic lymph node: Three lymph nodes negative for metastatic carcinoma (0/3). C. Right pelvic lymph node:  One lymph node negative for metastatic carcinoma (0/1). D. Prostate and bilateral seminal vesicles; laparoscopic radical prostatectomy:   Prostatic adenocarcinoma, acinar type, Gillian grade 7 (3+4), grade group 2, confined to the prostate, involving approximately 20-25% of the prostatic parenchyma examined. Intraductal carcinoma present, extensive. Perineural invasion is identified.   Bilateral vas deferens and seminal vesicles with no evidence of involvement by carcinoma. Carcinoma focally involves the inked posterior right margin. pT2 N0. See comment. LABS:    Component PSA   Latest Ref Rng & Units <=4.00 ng/mL   7/8/2023 <0.01   1/9/2023 <0.01   7/6/2022 <0.01   10/16/2021 9.47 (H)   8/7/2021 6.49 (H)   4/27/2019 3.75   1/9/2018 2.5   2/2/2017 2.3   11/2/2015 2.1       IMAGING:    CT A/P (2/17/2022): 1. There is a history of recently diagnosed prostate adenocarcinoma. No intra-abdominal lymphadenopathy/metastases. 2. Obstructing 4 mm stone in the proximal left ureter resulting in mild hydroureteronephrosis. No perinephric fluid collection. 3. Findings consistent with a chronic bladder outlet obstruction including diffuse bladder wall thickening/trabeculation with a small right lateral bladder wall diverticulum. 4. Lesser incidental findings as above. NM BS (2/18/2022): 1. No evidence of metastatic disease. 2. Left hydroureteronephrosis related to a ureteral calculus seen on recent CT scan. IMPRESSION:  48year old male with clinically localized several intermediate risk prostate cancer initially diagnosed 1/2022 upon prostate biopsy for elevated PSA. Status post robotic radical prostatectomy with pelvic lymph node dissection on 5/20/2022 for definitive local therapy. Final pathology revealed grade group 2 prostate cancer, confined to the prostate, involving 20-25% of the prostatic parenchyma. Extensive intraductal carcinoma present. + PNI. No MARQUITA, SVI, or LVI. Focal positive surgical margin (right posterior). 4 lymph nodes negative for malignancy. Final pathologic stage pT2 N0 R1 (stage IIb). Patient doing well. Excellent recovery of urinary continence. Patient not sexually active and not interested in management of ED after discussing with him. PSA level undetectable. Discussed with patient. Results reassuring. Discussed prostate cancer surveillance following definitive local treatment per NCCN guidelines.        All questions answered. PLAN:  1. Continue prostate cancer surveillance per NCCN guidelines. RTC for follow-up in 6 months with a PSA level prior to office visit.       Sara De Leon MD  7/10/2023

## 2023-07-28 DIAGNOSIS — I10 ESSENTIAL HYPERTENSION: ICD-10-CM

## 2023-07-28 RX ORDER — AMLODIPINE BESYLATE 5 MG/1
5 TABLET ORAL DAILY
Qty: 90 TABLET | Refills: 3 | Status: SHIPPED | OUTPATIENT
Start: 2023-07-28

## 2023-07-28 NOTE — TELEPHONE ENCOUNTER
Refill passed per [facility], [place] protocol. Requested Prescriptions   Pending Prescriptions Disp Refills    amLODIPine 5 MG Oral Tab 90 tablet 0     Sig: Take 1 tablet (5 mg total) by mouth daily.        Hypertensive Medications Protocol Passed - 7/28/2023  2:16 PM        Passed - In person appointment in the past 12 or next 3 months     Recent Outpatient Visits              2 weeks ago [facility])    Rg Mensah MD    Office Visit    5 months ago Essential hypertension    Drexel Boas, [place], Donita Converse, MD    Office Visit    6 months ago [facility])    Rg Mensah MD    Office Visit    1 year ago [facility])    Rg Mensah MD    Office Visit    1 year ago [facility])    Drexel Boas, [address], Rg Yousif MD    Office Visit          Future Appointments         Provider Department Appt Notes    In 5 months Mitali Zabala MD [facility], [address], [place] 6 month               Passed - Last BP reading less than 140/90     BP Readings from Last 1 Encounters:  02/21/23 : 130/70              Passed - CMP or BMP in past 6 months     Recent Results (from the past 4392 hour(s))   COMP METABOLIC PANEL (14)    Collection Time: 07/08/23 11:52 AM   Result Value Ref Range    Glucose 104 (H) 70 - 99 mg/dL    Sodium 143 136 - 145 mmol/L    Potassium 3.9 3.5 - 5.1 mmol/L    Chloride 108 98 - 112 mmol/L    CO2 28.0 21.0 - 32.0 mmol/L    Anion Gap 7 0 - 18 mmol/L    BUN 12 7 - 18 mg/dL    Creatinine 0.92 0.70 - 1.30 mg/dL    BUN/CREA Ratio 13.0 10.0 - 20.0    Calcium, Total 8.9 8.5 - 10.1 mg/dL    Calculated Osmolality 296 (H) 275 - 295 mOsm/kg    eGFR-Cr 99 >=60 mL/min/1.73m2    ALT 20 16 - 61 U/L    AST 15 15 - 37 U/L    Alkaline Phosphatase 66 45 - 117 U/L    Bilirubin, Total 0.9 0.1 - 2.0 mg/dL    Total Protein 7.1 6.4 - 8.2 g/dL    Albumin 3.7 3.4 - 5.0 g/dL    Globulin  3.4 2.8 - 4.4 g/dL    A/G Ratio 1.1 1.0 - 2.0    Patient Fasting for CMP? Yes      *Note: Due to a large number of results and/or encounters for the requested time period, some results have not been displayed. A complete set of results can be found in Results Review.                Passed - In person appointment or virtual visit in the past 6 months     Recent Outpatient Visits              2 weeks ago [facility])    Ambreen Moreno MD    Office Visit    5 months ago Essential hypertension    Lubna Smith MD    Office Visit    6 months ago [facility])    [address] [place] [address] Xi Bird MD    Office Visit    1 year ago [facility])    [facility]    [facility] Visit    1 year ago [facility])    [address] [place], [place] [address]Xi MD    Office Visit          Future Appointments         Provider Department Appt Notes    In 5 months Gilma Roche MD [facility], [address], [place] 6 month               Passed - [facility] or GFRNAA > 50     GFR Evaluation  EGFRCR: 99 , resulted on 7/8/2023             Future Appointments         Provider Department Appt Notes    In 5 months MD Dena Em, [address], [place] 6 month          Recent Outpatient Visits              2 weeks ago [facility])    Ambreen Moreno MD    Office Visit    5 months ago Essential hypertension    Lubna Smith MD    Office Visit 6 months ago Prostate cancer [facility])    [address] MD    Office Visit    1 year ago Prostate cancer [facility])    [address] MD    Office Visit    1 year ago Prostate cancer [facility])    [address], Summer Rios MD    Office Visit

## 2024-01-06 ENCOUNTER — LAB ENCOUNTER (OUTPATIENT)
Dept: LAB | Facility: HOSPITAL | Age: 54
End: 2024-01-06
Attending: UROLOGY
Payer: COMMERCIAL

## 2024-01-06 DIAGNOSIS — C61 PROSTATE CANCER (HCC): ICD-10-CM

## 2024-01-06 LAB — PSA SERPL-MCNC: 0.04 NG/ML (ref ?–4)

## 2024-01-06 PROCEDURE — 84153 ASSAY OF PSA TOTAL: CPT

## 2024-01-06 PROCEDURE — 36415 COLL VENOUS BLD VENIPUNCTURE: CPT

## 2024-01-10 ENCOUNTER — OFFICE VISIT (OUTPATIENT)
Dept: SURGERY | Facility: CLINIC | Age: 54
End: 2024-01-10
Payer: COMMERCIAL

## 2024-01-10 DIAGNOSIS — R97.21 RISING PSA FOLLOWING TREATMENT FOR MALIGNANT NEOPLASM OF PROSTATE: ICD-10-CM

## 2024-01-10 DIAGNOSIS — C61 PROSTATE CANCER (HCC): Primary | ICD-10-CM

## 2024-01-10 PROCEDURE — 99214 OFFICE O/P EST MOD 30 MIN: CPT | Performed by: UROLOGY

## 2024-01-10 NOTE — PROGRESS NOTES
[facility] Urology  Follow-Up Visit    HPI: Yuri Mckeon is a 53 year old male presents for a follow up visit. Patient was last seen on 7/10/2023.  Here by himself.    INTERVAL HISTORY: History of clinically localized favorable intermediate risk prostate cancer initially diagnosed January 2022 upon prostate biopsy.    Patient underwent a robotic assisted radical prostatectomy with pelvic lymph node dissection on 5/20/2022.    Final pathology revealed grade group 2 prostate cancer, confined to the prostate, involving 20-25% of the prostatic parenchyma.  Extensive intraductal carcinoma present. + PNI.  No MARQUITA, SVI, or LVI.  Focal positive surgical margin (right posterior).  4 lymph nodes negative for malignancy.  Final pathologic stage pT2 N0 R1.    Post prostatectomy PSA levels have been undetectable up until recent PSA from 1/6/2024 which became detectable at 0.04 ng/mL.    Excellent recovery of urinary control following surgery.  Uses 1 depend for safety per 24 hours.  No leakage at night.  Previously referred to pelvic floor PT however patient did not schedule.    He is not sexually active.  Previously provided a prescription for Viagra however patient did not try.    Otherwise doing well.  No fevers or chills, chest pain or shortness of breath, nausea or vomiting.  Bowel habits are back to normal.     No gross hematuria or dysuria.  Excellent urinary stream.      1. Clinically Localized Favorable Intermediate Risk Prostate Cancer (pT2 pN0 R1 cM0)  Patient reports family history of prostate cancer in his father and paternal uncle.  Both are alive and doing well from a prostate cancer standpoint.  Both underwent radical prostatectomy for definitive local therapy.     Patient had an elevated screening PSA level up to 9.47 ng/mL from 10/2021.  He was seen by Dr. Young who performed a TRUS-guided prostate biopsy on 1/21/2022 which revealed:  - Prostate volume on TRUS: 30 g.  - Grade group 2 ([place] 3+4=7) PCa  in 5/12 cores (RB, LM, LA), up to 20% involvement, no PNI.  - ASAP in 1/12 cores, right mid.     Patient completed a staging nuclear medicine bone scan and CT abdomen and pelvis which did not reveal any evidence of metastatic disease.     Incidentally on his CT scan he was found to have a 4 mm obstructing proximal left ureteral stone with mild hydroureteronephrosis.  Findings consistent with a chronic bladder outlet obstruction including diffuse bladder wall thickening/trabeculation with a small right lateral bladder wall diverticulum.     Patient completely asymptomatic from a kidney stone standpoint.  No previous history of nephrolithiasis.     He had a follow-up KUB film on 3/1/2022 which revealed that his left ureteral stone has migrated distally near the UVJ.  He again denies any flank pain or associated symptoms.  He has not noticed passing the stone.     Patient currently not sexually active however he denies any difficulty obtaining and maintaining erections.     His IPSS score is 0 with a quality-of-life score of 1.       No gross hematuria or dysuria, fevers or chills, chest pain or shortness of breath, nausea or vomiting.    - 5/20/22: Patient underwent a robotic assisted radical prostatectomy with pelvic lymph node dissection.    Final pathology revealed grade group 2 prostate cancer, confined to the prostate, involving 20-25% of the prostatic parenchyma.  Extensive intraductal carcinoma present. + PNI.  No MARQUITA, SVI, or LVI.  Focal positive surgical margin (right posterior).  4 lymph nodes negative for malignancy.  Final pathologic stage pT2 N0 R1 (stage IIb).    - 7/2022 F/U: PSA <0.01. Down to 1 depend daily.  Dry at night.  No erections.  Referral for pelvic floor PT.  Start rehab with Viagra 25 to 50 mg twice weekly.  Follow-up in 6 months.    - 1/2023 F/U: PSA undetectable. 1 depend daily.  Dry at night.  No erections.  Did not try Viagra.      - 7/2023 F/U: PSA undetectable. 1 depend daily.  Dry at  night.  No erections.  Did not try Viagra.      - 1/2024 F/U: PSA detectable at 0.04 ng/mL.  1 depends daily.  Dry at night.  No erections.  Not interested in further treatment of ED.       PAST MEDICAL HISTORY: HTN, history of pneumonia, prostate cancer 1/2022.     PAST SURGICAL HISTORY: pilonidal cyst surgery 2006, robotic radical prostatectomy 5/2022..     SOCIAL HISTORY:  and has 3 children. Active smoker of 1 pack/day for 25 years.  He drinks alcohol socially.  He works as a  with [employer].      Reviewed past medical, surgical, family, and social history.  Reviewed med list and allergies.      REVIEW OF SYSTEMS:  Pertinent positives and negatives per HPI. A 10-point ROS was performed and is otherwise negative.       EXAM:  There were no vitals taken for this visit.     Physical Exam  Constitutional:       Appearance: He is well-developed.   HENT:      Head: Normocephalic.   Eyes:      General: No scleral icterus.  Cardiovascular:      Rate and Rhythm: Normal rate.   Pulmonary:      Effort: Pulmonary effort is normal.   Skin:     General: Skin is warm and dry.   Neurological:      Mental Status: He is alert and oriented to person, place, and time.   Psychiatric:         Mood and Affect: Mood normal.         Behavior: Behavior normal.       PATHOLOGY:    Pathology                                Case: [id number]    Provider:  Rafat Card MD       Collected:           05/20/2022     A. Periprostatic fat:  Benign adipose tissue with no evidence of involvement by adenocarcinoma.     B. Left pelvic lymph node:  Three lymph nodes negative for metastatic carcinoma (0/3).     C. Right pelvic lymph node:  One lymph node negative for metastatic carcinoma (0/1).     D. Prostate and bilateral seminal vesicles; laparoscopic radical prostatectomy:   Prostatic adenocarcinoma, acinar type, [place] grade 7 (3+4), grade group 2, confined to the prostate, involving approximately 20-25% of  the prostatic parenchyma examined.  Intraductal carcinoma present, extensive.  Perineural invasion is identified.  Bilateral vas deferens and seminal vesicles with no evidence of involvement by carcinoma.  Carcinoma focally involves the inked posterior right margin.   pT2 N0.  See comment.      LABS:    Component PSA   Latest Ref Rng & Units <=4.00 ng/mL   1/6/2024 0.04   7/8/2023 <0.01   1/9/2023 <0.01   7/6/2022 <0.01   10/16/2021 9.47 (H)   8/7/2021 6.49 (H)   4/27/2019 3.75   1/9/2018 2.5   2/2/2017 2.3   11/2/2015 2.1       IMAGING:    CT A/P (2/17/2022): 1. There is a history of recently diagnosed prostate adenocarcinoma.  No intra-abdominal lymphadenopathy/metastases.  2. Obstructing 4 mm stone in the proximal left ureter resulting in mild hydroureteronephrosis.  No perinephric fluid collection.  3. Findings consistent with a chronic bladder outlet obstruction including diffuse bladder wall thickening/trabeculation with a small right lateral bladder wall diverticulum.  4. Lesser incidental findings as above.     NM BS (2/18/2022): 1. No evidence of metastatic disease.  2. Left hydroureteronephrosis related to a ureteral calculus seen on recent CT scan.       IMPRESSION:  53 year old male with clinically localized several intermediate risk prostate cancer initially diagnosed 1/2022 upon prostate biopsy for elevated PSA.    Status post robotic radical prostatectomy with pelvic lymph node dissection on 5/20/2022 for definitive local therapy.    Final pathology revealed grade group 2 prostate cancer, confined to the prostate, involving 20-25% of the prostatic parenchyma.  Extensive intraductal carcinoma present. + PNI.  No MARQUITA, SVI, or LVI.  Focal positive surgical margin (right posterior).  4 lymph nodes negative for malignancy.  Final pathologic stage pT2 N0 R1 (stage IIb).    Patient doing well.  Excellent recovery of urinary continence.    Patient not sexually active and not interested in management of ED  after discussing with him.    PSA level recently became detectable at 0.04 ng/mL.  This is new.  Discussed with patient.    Discussed that this might be a marker of prostate cancer biochemical recurrence.    I recommend continued monitoring with closer interval follow-up, in 3 months to establish a trend and evaluate doubling time.    Patient agreeable.    All questions answered.      PLAN:  1.  Continue prostate cancer surveillance. Check PSA in 3 months.     RTC for follow-up in 3 months with a PSA level prior to office visit.      Rafat Card MD  1/10/2024

## 2024-03-16 ENCOUNTER — OFFICE VISIT (OUTPATIENT)
Dept: INTERNAL MEDICINE CLINIC | Facility: CLINIC | Age: 54
End: 2024-03-16
Payer: COMMERCIAL

## 2024-03-16 VITALS
TEMPERATURE: 98 F | DIASTOLIC BLOOD PRESSURE: 69 MMHG | OXYGEN SATURATION: 100 % | RESPIRATION RATE: 18 BRPM | HEIGHT: 72 IN | BODY MASS INDEX: 26.82 KG/M2 | WEIGHT: 198 LBS | SYSTOLIC BLOOD PRESSURE: 120 MMHG | HEART RATE: 88 BPM

## 2024-03-16 DIAGNOSIS — Z82.49 FH: CAD (CORONARY ARTERY DISEASE): ICD-10-CM

## 2024-03-16 DIAGNOSIS — I10 ESSENTIAL HYPERTENSION: ICD-10-CM

## 2024-03-16 DIAGNOSIS — Z00.00 PREVENTATIVE HEALTH CARE: ICD-10-CM

## 2024-03-16 DIAGNOSIS — E78.00 HYPERCHOLESTEROLEMIA: Primary | ICD-10-CM

## 2024-03-16 PROCEDURE — 99214 OFFICE O/P EST MOD 30 MIN: CPT | Performed by: INTERNAL MEDICINE

## 2024-03-16 RX ORDER — LOSARTAN POTASSIUM 100 MG/1
100 TABLET ORAL DAILY
Qty: 90 TABLET | Refills: 3 | Status: SHIPPED | OUTPATIENT
Start: 2024-03-16

## 2024-03-16 RX ORDER — AMLODIPINE BESYLATE 5 MG/1
5 TABLET ORAL DAILY
Qty: 90 TABLET | Refills: 3 | Status: SHIPPED | OUTPATIENT
Start: 2024-03-16

## 2024-03-16 NOTE — PROGRESS NOTES
Subjective:     Patient ID: Yuir Mckeon is a 53 year old male.    HPI    Patient comes in first time to establish care needs refill on his medication denies any chest pain shortness of breath he is a smoker not ready to quit at this time does have history of prostate cancer status post resection follows with urology has been stable also family history of heart disease his father      History/Other:   Review of Systems   Constitutional: Negative.  Negative for fatigue and fever.   HENT: Negative.  Negative for congestion.    Eyes: Negative.    Respiratory: Negative.  Negative for cough, shortness of breath and wheezing.    Cardiovascular: Negative.  Negative for chest pain, palpitations and leg swelling.   Gastrointestinal: Negative.    Endocrine: Negative for cold intolerance and heat intolerance.   Genitourinary: Negative.  Negative for dysuria, flank pain and hematuria.   Musculoskeletal: Negative.  Negative for arthralgias, back pain and myalgias.   Skin: Negative.    Neurological: Negative.  Negative for dizziness, tremors, syncope, weakness and headaches.   Psychiatric/Behavioral: Negative.  Negative for agitation, behavioral problems and suicidal ideas. The patient is not nervous/anxious.      Current Outpatient Medications   Medication Sig Dispense Refill    amLODIPine 5 MG Oral Tab Take 1 tablet (5 mg total) by mouth daily. 90 tablet 3    losartan 100 MG Oral Tab Take 1 tablet (100 mg total) by mouth daily. 90 tablet 3    Multiple Vitamins-Minerals (MULTIVITAMIN ADULT, MINERALS,) Oral Tab Take by mouth.      Cholecalciferol (VITAMIN D) 25 MCG (1000 UT) Oral Tab       Sildenafil Citrate 50 MG Oral Tab Take 0.5-1 tablets (25-50 mg total) by mouth twice a week. 6 tablet 5     Allergies:No Known Allergies    Past Medical History:   Diagnosis Date    Allergic     as a child, environmental allergies    Cancer (HCC) 04/2022    prostate    High blood pressure     High cholesterol     Pneumonia High  school    Hospitalized 1 week      Past Surgical History:   Procedure Laterality Date    LAPAROSCOPY, SURGICAL PROSTATECTOMY, RETROPUBIC RADICAL, W/NERVE SPARING  05/20/2022    RALP + PLND. Dr. Card @ [facility]    OTHER  2006    Pilonidal cystectomy      Family History   Problem Relation Age of Onset    Heart Disease Father         MI age 50s    Prostate Cancer Father     Hypertension Father     Cancer Father       Social History:   Social History     Socioeconomic History    Marital status:    Occupational History    Occupation:    Tobacco Use    Smoking status: Every Day     Packs/day: 1.00     Years: 25.00     Additional pack years: 0.00     Total pack years: 25.00     Types: Cigarettes    Smokeless tobacco: Current   Vaping Use    Vaping Use: Never used   Substance and Sexual Activity    Alcohol use: Yes     Alcohol/week: 5.0 standard drinks of alcohol     Types: 5 Cans of beer per week     Comment: social    Drug use: Yes     Types: Cannabis     Comment: Daily    Sexual activity: Not Currently   Other Topics Concern    Caffeine Concern Yes     Comment: 1 or 2 cups of coffee daily        Objective:   Physical Exam  Vitals and nursing note reviewed.   Constitutional:       Appearance: He is well-developed.   HENT:      Head: Normocephalic and atraumatic.      Right Ear: External ear normal.      Left Ear: External ear normal.      Nose: Nose normal.   Eyes:      Conjunctiva/sclera: Conjunctivae normal.      Pupils: Pupils are equal, round, and reactive to light.   Cardiovascular:      Rate and Rhythm: Normal rate and regular rhythm.      Heart sounds: Normal heart sounds.   Pulmonary:      Effort: Pulmonary effort is normal.      Breath sounds: Normal breath sounds.   Abdominal:      General: Bowel sounds are normal.      Palpations: Abdomen is soft.   Genitourinary:     Penis: Normal.       Prostate: Normal.      Rectum: Normal.   Musculoskeletal:         General: Normal range of motion.       Cervical back: Normal range of motion and neck supple.   Skin:     General: Skin is warm and dry.   Neurological:      Mental Status: He is alert and oriented to person, place, and time.      Deep Tendon Reflexes: Reflexes are normal and symmetric.         Assessment & Plan:   1. Hypercholesterolemia watch diet we will retest when he comes in for annual physical recommended patient get cardiac calcium score   2. Essential hypertension continue current treatment watch diet   3. Preventative health care will refer to GI for screening colonoscopy   4. FH: CAD (coronary artery disease) as above get the cardiac calcium score       No orders of the defined types were placed in this encounter.      Meds This Visit:  Requested Prescriptions     Signed Prescriptions Disp Refills    amLODIPine 5 MG Oral Tab 90 tablet 3     Sig: Take 1 tablet (5 mg total) by mouth daily.    losartan 100 MG Oral Tab 90 tablet 3     Sig: Take 1 tablet (100 mg total) by mouth daily.       Imaging & Referrals:  OP REFERRAL TO [facility] GI TELEPHONE COLON SCREEN  CT CALCIUM SCORING

## 2024-04-20 ENCOUNTER — LAB ENCOUNTER (OUTPATIENT)
Dept: LAB | Facility: HOSPITAL | Age: 54
End: 2024-04-20
Attending: UROLOGY
Payer: COMMERCIAL

## 2024-04-20 DIAGNOSIS — C61 PROSTATE CANCER (HCC): ICD-10-CM

## 2024-04-20 LAB — PSA SERPL-MCNC: 0.05 NG/ML (ref ?–4)

## 2024-04-20 PROCEDURE — 36415 COLL VENOUS BLD VENIPUNCTURE: CPT

## 2024-04-20 PROCEDURE — 84153 ASSAY OF PSA TOTAL: CPT

## 2024-04-22 ENCOUNTER — OFFICE VISIT (OUTPATIENT)
Dept: SURGERY | Facility: CLINIC | Age: 54
End: 2024-04-22
Payer: COMMERCIAL

## 2024-04-22 DIAGNOSIS — N52.31 ERECTILE DYSFUNCTION AFTER RADICAL PROSTATECTOMY: ICD-10-CM

## 2024-04-22 DIAGNOSIS — R97.21 RISING PSA FOLLOWING TREATMENT FOR MALIGNANT NEOPLASM OF PROSTATE: ICD-10-CM

## 2024-04-22 DIAGNOSIS — C61 PROSTATE CANCER (HCC): Primary | ICD-10-CM

## 2024-04-22 DIAGNOSIS — N39.3 MALE STRESS INCONTINENCE: ICD-10-CM

## 2024-04-22 PROCEDURE — 99213 OFFICE O/P EST LOW 20 MIN: CPT | Performed by: UROLOGY

## 2024-04-22 NOTE — PROGRESS NOTES
[facility] Urology  Follow-Up Visit    HPI: Yuri Mckeon is a 53 year old male presents for a follow up visit. Patient was last seen on 1/10/2024.  Here by himself.    INTERVAL HISTORY: History of clinically localized favorable intermediate risk prostate cancer initially diagnosed January 2022 upon prostate biopsy.    Patient underwent a robotic assisted radical prostatectomy with pelvic lymph node dissection on 5/20/2022.    Final pathology revealed grade group 2 prostate cancer, confined to the prostate, involving 20-25% of the prostatic parenchyma.  Extensive intraductal carcinoma present. + PNI.  No MARQUITA, SVI, or LVI.  Focal positive surgical margin (right posterior).  4 lymph nodes negative for malignancy.  Final pathologic stage pT2 N0 R1.    Post prostatectomy PSA levels have been undetectable up until recent PSA from 1/6/2024 which became detectable at 0.04 ng/mL.    Recent PSA 4/20/2024 relatively stable/slightly increased to 0.05 ng/mL.    Excellent recovery of urinary control following surgery.  Uses 1 depend for safety per 24 hours.  No leakage at night.  Previously referred to pelvic floor PT however patient did not schedule.    He is not sexually active.  Previously provided a prescription for Viagra however patient did not try.    Otherwise doing well.  No fevers or chills, chest pain or shortness of breath, nausea or vomiting.  Bowel habits are back to normal.     No gross hematuria or dysuria.  Excellent urinary stream.      1. Clinically Localized Favorable Intermediate Risk Prostate Cancer (pT2 pN0 R1 cM0)  Patient reports family history of prostate cancer in his father and paternal uncle.  Both are alive and doing well from a prostate cancer standpoint.  Both underwent radical prostatectomy for definitive local therapy.     Patient had an elevated screening PSA level up to 9.47 ng/mL from 10/2021.  He was seen by Dr. Young who performed a TRUS-guided prostate biopsy on 1/21/2022 which  revealed:  - Prostate volume on TRUS: 30 g.  - Grade group 2 ([place] 3+4=7) PCa in 5/12 cores (RB, LM, LA), up to 20% involvement, no PNI.  - ASAP in 1/12 cores, right mid.     Patient completed a staging nuclear medicine bone scan and CT abdomen and pelvis which did not reveal any evidence of metastatic disease.     Incidentally on his CT scan he was found to have a 4 mm obstructing proximal left ureteral stone with mild hydroureteronephrosis.  Findings consistent with a chronic bladder outlet obstruction including diffuse bladder wall thickening/trabeculation with a small right lateral bladder wall diverticulum.     Patient completely asymptomatic from a kidney stone standpoint.  No previous history of nephrolithiasis.     He had a follow-up KUB film on 3/1/2022 which revealed that his left ureteral stone has migrated distally near the UVJ.  He again denies any flank pain or associated symptoms.  He has not noticed passing the stone.     Patient currently not sexually active however he denies any difficulty obtaining and maintaining erections.     His IPSS score is 0 with a quality-of-life score of 1.       No gross hematuria or dysuria, fevers or chills, chest pain or shortness of breath, nausea or vomiting.    - 5/20/22: Patient underwent a robotic assisted radical prostatectomy with pelvic lymph node dissection.    Final pathology revealed grade group 2 prostate cancer, confined to the prostate, involving 20-25% of the prostatic parenchyma.  Extensive intraductal carcinoma present. + PNI.  No MARQUITA, SVI, or LVI.  Focal positive surgical margin (right posterior).  4 lymph nodes negative for malignancy.  Final pathologic stage pT2 N0 R1 (stage IIb).    - 7/2022 F/U: PSA <0.01. Down to 1 depend daily.  Dry at night.  No erections.  Referral for pelvic floor PT.  Start rehab with Viagra 25 to 50 mg twice weekly.  Follow-up in 6 months.    - 1/2023 F/U: PSA undetectable. 1 depend daily.  Dry at night.  No erections.   Did not try Viagra.      - 7/2023 F/U: PSA undetectable. 1 depend daily.  Dry at night.  No erections.  Did not try Viagra.      - 1/2024 F/U: PSA detectable at 0.04 ng/mL.  1 depends daily.  Dry at night.  No erections.  Not interested in further treatment of ED.    - 4/2024 F/U: PSA 0.05 ng/mL.  1 depend daily.  Dry at night.  No erections.  Not interested in further treatment of ED.       PAST MEDICAL HISTORY: HTN, history of pneumonia, prostate cancer 1/2022.     PAST SURGICAL HISTORY: pilonidal cyst surgery 2006, robotic radical prostatectomy 5/2022..     SOCIAL HISTORY:  and has 3 children. Active smoker of 1 pack/day for 25 years.  He drinks alcohol socially.  He works as a  with [employer].      Reviewed past medical, surgical, family, and social history.  Reviewed med list and allergies.      REVIEW OF SYSTEMS:  Pertinent positives and negatives per HPI. A 10-point ROS was performed and is otherwise negative.       EXAM:  There were no vitals taken for this visit.     Physical Exam  Constitutional:       Appearance: He is well-developed.   HENT:      Head: Normocephalic.   Eyes:      General: No scleral icterus.  Cardiovascular:      Rate and Rhythm: Normal rate.   Pulmonary:      Effort: Pulmonary effort is normal.   Skin:     General: Skin is warm and dry.   Neurological:      Mental Status: He is alert and oriented to person, place, and time.   Psychiatric:         Mood and Affect: Mood normal.         Behavior: Behavior normal.       PATHOLOGY:    Pathology                                Case: [id number]    Provider:  Rafat Card MD       Collected:           05/20/2022     A. Periprostatic fat:  Benign adipose tissue with no evidence of involvement by adenocarcinoma.     B. Left pelvic lymph node:  Three lymph nodes negative for metastatic carcinoma (0/3).     C. Right pelvic lymph node:  One lymph node negative for metastatic carcinoma (0/1).     D. Prostate  and bilateral seminal vesicles; laparoscopic radical prostatectomy:   Prostatic adenocarcinoma, acinar type, [place] grade 7 (3+4), grade group 2, confined to the prostate, involving approximately 20-25% of the prostatic parenchyma examined.  Intraductal carcinoma present, extensive.  Perineural invasion is identified.  Bilateral vas deferens and seminal vesicles with no evidence of involvement by carcinoma.  Carcinoma focally involves the inked posterior right margin.   pT2 N0.  See comment.      LABS:    Component PSA   Latest Ref Rng & Units <=4.00 ng/mL   4/20/2024 0.05   1/6/2024 0.04   7/8/2023 <0.01   1/9/2023 <0.01   7/6/2022 <0.01   10/16/2021 9.47 (H)   8/7/2021 6.49 (H)   4/27/2019 3.75   1/9/2018 2.5   2/2/2017 2.3   11/2/2015 2.1       IMAGING:    CT A/P (2/17/2022): 1. There is a history of recently diagnosed prostate adenocarcinoma.  No intra-abdominal lymphadenopathy/metastases.  2. Obstructing 4 mm stone in the proximal left ureter resulting in mild hydroureteronephrosis.  No perinephric fluid collection.  3. Findings consistent with a chronic bladder outlet obstruction including diffuse bladder wall thickening/trabeculation with a small right lateral bladder wall diverticulum.  4. Lesser incidental findings as above.     NM BS (2/18/2022): 1. No evidence of metastatic disease.  2. Left hydroureteronephrosis related to a ureteral calculus seen on recent CT scan.       IMPRESSION:  53 year old male with clinically localized several intermediate risk prostate cancer initially diagnosed 1/2022 upon prostate biopsy for elevated PSA.    Status post robotic radical prostatectomy with pelvic lymph node dissection on 5/20/2022 for definitive local therapy.    Final pathology revealed grade group 2 prostate cancer, confined to the prostate, involving 20-25% of the prostatic parenchyma.  Extensive intraductal carcinoma present. + PNI.  No MARQUITA, SVI, or LVI.  Focal positive surgical margin (right posterior).  4  lymph nodes negative for malignancy.  Final pathologic stage pT2 N0 R1 (stage IIb).    Patient doing well.  Excellent recovery of urinary continence.    Patient not sexually active and not interested in management of ED after discussing with him.    PSA levels became detectable 1/2024, yet very low at 0.04 ng/mL.     PSA levels are being monitored.  His recent PSA level is relatively stable without significant change.  Doubling time not established yet.  PSA kinetics somewhat reassuring so far.    Recent PSA levels reviewed with patient.  Options discussed including continued monitoring with setting a PSA threshold of 0.1 ng/mL before considering restaging imaging and discussing salvage therapy.  Other option would be to consider early salvage RT at this level.    Patient agrees to continued monitoring of his PSA levels with repeat in 3 months.    All questions answered.      PLAN:  1.  Continue to closely monitor PSA levels with repeat in 3 months.    RTC for follow-up in 3 months with a PSA level prior to office visit.      Rafat Card MD  4/22/2024

## 2024-07-27 ENCOUNTER — LAB ENCOUNTER (OUTPATIENT)
Dept: LAB | Facility: HOSPITAL | Age: 54
End: 2024-07-27
Attending: UROLOGY
Payer: COMMERCIAL

## 2024-07-27 DIAGNOSIS — C61 PROSTATE CANCER (HCC): ICD-10-CM

## 2024-07-27 DIAGNOSIS — R97.21 RISING PSA FOLLOWING TREATMENT FOR MALIGNANT NEOPLASM OF PROSTATE: ICD-10-CM

## 2024-07-27 LAB — PSA SERPL-MCNC: 0.06 NG/ML (ref ?–4)

## 2024-07-27 PROCEDURE — 36415 COLL VENOUS BLD VENIPUNCTURE: CPT

## 2024-07-27 PROCEDURE — 84153 ASSAY OF PSA TOTAL: CPT

## 2024-07-29 ENCOUNTER — OFFICE VISIT (OUTPATIENT)
Dept: SURGERY | Facility: CLINIC | Age: 54
End: 2024-07-29
Payer: COMMERCIAL

## 2024-07-29 DIAGNOSIS — N52.31 ERECTILE DYSFUNCTION AFTER RADICAL PROSTATECTOMY: ICD-10-CM

## 2024-07-29 DIAGNOSIS — N39.3 MALE STRESS INCONTINENCE: ICD-10-CM

## 2024-07-29 DIAGNOSIS — R97.21 RISING PSA FOLLOWING TREATMENT FOR MALIGNANT NEOPLASM OF PROSTATE: Primary | ICD-10-CM

## 2024-07-29 DIAGNOSIS — C61 PROSTATE CANCER (HCC): ICD-10-CM

## 2024-07-29 PROCEDURE — 99214 OFFICE O/P EST MOD 30 MIN: CPT | Performed by: UROLOGY

## 2024-07-29 NOTE — PROGRESS NOTES
[facility]  Follow-Up Visit    HPI: Yuri Mckeon is a 54 year old male presents for a follow up visit. Patient was last seen on 4/22/2024.  Here by himself.    INTERVAL HISTORY: History of clinically localized favorable intermediate risk prostate cancer initially diagnosed January 2022 upon prostate biopsy.    Patient underwent a robotic assisted radical prostatectomy with pelvic lymph node dissection on 5/20/2022.    Final pathology revealed grade group 2 prostate cancer, confined to the prostate, involving 20-25% of the prostatic parenchyma.  Extensive intraductal carcinoma present. + PNI.  No MARQUITA, SVI, or LVI.  Focal positive surgical margin (right posterior).  4 lymph nodes negative for malignancy.  Final pathologic stage pT2 N0 R1.    Post prostatectomy PSA levels have been undetectable up until recent PSA from 1/6/2024 which became detectable at 0.04 ng/mL.    Recent PSA 7/27/2024 slightly increased to 0.06 ng/mL.    Excellent recovery of urinary control following surgery.  Uses 1 pad every other day.  No leakage at night.  Previously referred to pelvic floor PT however patient did not schedule.    He is not sexually active.  Previously provided a prescription for Viagra however patient did not try.    Otherwise doing well.  No fevers or chills, chest pain or shortness of breath, nausea or vomiting.  Bowel habits are back to normal.     No gross hematuria or dysuria.  Excellent urinary stream.      1. Clinically Localized Favorable Intermediate Risk Prostate Cancer (pT2 pN0 R1 cM0)  Patient reports family history of prostate cancer in his father and paternal uncle.  Both are alive and doing well from a prostate cancer standpoint.  Both underwent radical prostatectomy for definitive local therapy.     Patient had an elevated screening PSA level up to 9.47 ng/mL from 10/2021.  He was seen by Dr. Young who performed a TRUS-guided prostate biopsy on 1/21/2022 which revealed:  - Prostate volume on  TRUS: 30 g.  - Grade group 2 (Gillian 3+4=7) PCa in 5/12 cores (RB, LM, LA), up to 20% involvement, no PNI.  - ASAP in 1/12 cores, right mid.     Patient completed a staging nuclear medicine bone scan and CT abdomen and pelvis which did not reveal any evidence of metastatic disease.     Incidentally on his CT scan he was found to have a 4 mm obstructing proximal left ureteral stone with mild hydroureteronephrosis.  Findings consistent with a chronic bladder outlet obstruction including diffuse bladder wall thickening/trabeculation with a small right lateral bladder wall diverticulum.     Patient completely asymptomatic from a kidney stone standpoint.  No previous history of nephrolithiasis.     He had a follow-up KUB film on 3/1/2022 which revealed that his left ureteral stone has migrated distally near the UVJ.  He again denies any flank pain or associated symptoms.  He has not noticed passing the stone.     Patient currently not sexually active however he denies any difficulty obtaining and maintaining erections.     His IPSS score is 0 with a quality-of-life score of 1.       No gross hematuria or dysuria, fevers or chills, chest pain or shortness of breath, nausea or vomiting.    - 5/20/22: Patient underwent a robotic assisted radical prostatectomy with pelvic lymph node dissection.    Final pathology revealed grade group 2 prostate cancer, confined to the prostate, involving 20-25% of the prostatic parenchyma.  Extensive intraductal carcinoma present. + PNI.  No MARQUITA, SVI, or LVI.  Focal positive surgical margin (right posterior).  4 lymph nodes negative for malignancy.  Final pathologic stage pT2 N0 R1 (stage IIb).    - 7/2022 F/U: PSA <0.01. Down to 1 depend daily.  Dry at night.  No erections.  Referral for pelvic floor PT.  Start rehab with Viagra 25 to 50 mg twice weekly.  Follow-up in 6 months.    - 1/2023 F/U: PSA undetectable. 1 depend daily.  Dry at night.  No erections.  Did not try Viagra.      -  7/2023 F/U: PSA undetectable. 1 depend daily.  Dry at night.  No erections.  Did not try Viagra.      - 1/2024 F/U: PSA detectable at 0.04 ng/mL.  1 depends daily.  Dry at night.  No erections.  Not interested in further treatment of ED.    - 4/2024 F/U: PSA 0.05 ng/mL.  1 depend daily.  Dry at night.  No erections.  Not interested in further treatment of ED.    - 7/2024 F/U: PSA 0.06 ng/mL.  1 pad every other day.  Dry at night.  No erections.  Not interested in further treatment of ED.       PAST MEDICAL HISTORY: HTN, history of pneumonia, prostate cancer 1/2022.     PAST SURGICAL HISTORY: pilonidal cyst surgery 2006, robotic radical prostatectomy 5/2022..     SOCIAL HISTORY:  and has 3 children. Active smoker of 1 pack/day for 25 years.  He drinks alcohol socially.  He works as a  with [employer].      Reviewed past medical, surgical, family, and social history.  Reviewed med list and allergies.      REVIEW OF SYSTEMS:  Pertinent positives and negatives per HPI. A 10-point ROS was performed and is otherwise negative.       EXAM:  There were no vitals taken for this visit.     Physical Exam  Constitutional:       Appearance: He is well-developed.   HENT:      Head: Normocephalic.   Eyes:      General: No scleral icterus.  Cardiovascular:      Rate and Rhythm: Normal rate.   Pulmonary:      Effort: Pulmonary effort is normal.   Skin:     General: Skin is warm and dry.   Neurological:      Mental Status: He is alert and oriented to person, place, and time.   Psychiatric:         Mood and Affect: Mood normal.         Behavior: Behavior normal.       PATHOLOGY:    Pathology                                Case: [id number]    Provider:  Rafat Card MD       Collected:           05/20/2022     A. Periprostatic fat:  Benign adipose tissue with no evidence of involvement by adenocarcinoma.     B. Left pelvic lymph node:  Three lymph nodes negative for metastatic carcinoma (0/3).      C. Right pelvic lymph node:  One lymph node negative for metastatic carcinoma (0/1).     D. Prostate and bilateral seminal vesicles; laparoscopic radical prostatectomy:   Prostatic adenocarcinoma, acinar type, Gillian grade 7 (3+4), grade group 2, confined to the prostate, involving approximately 20-25% of the prostatic parenchyma examined.  Intraductal carcinoma present, extensive.  Perineural invasion is identified.  Bilateral vas deferens and seminal vesicles with no evidence of involvement by carcinoma.  Carcinoma focally involves the inked posterior right margin.   pT2 N0.  See comment.      LABS:    Component PSA   Latest Ref Rng & Units <=4.00 ng/mL   7/27/2024 0.06    4/20/2024 0.05   1/6/2024 0.04   7/8/2023 <0.01   1/9/2023 <0.01   7/6/2022 <0.01   10/16/2021 9.47 (H)   8/7/2021 6.49 (H)   4/27/2019 3.75   1/9/2018 2.5   2/2/2017 2.3   11/2/2015 2.1       IMAGING:    CT A/P (2/17/2022): 1. There is a history of recently diagnosed prostate adenocarcinoma.  No intra-abdominal lymphadenopathy/metastases.  2. Obstructing 4 mm stone in the proximal left ureter resulting in mild hydroureteronephrosis.  No perinephric fluid collection.  3. Findings consistent with a chronic bladder outlet obstruction including diffuse bladder wall thickening/trabeculation with a small right lateral bladder wall diverticulum.  4. Lesser incidental findings as above.     NM BS (2/18/2022): 1. No evidence of metastatic disease.  2. Left hydroureteronephrosis related to a ureteral calculus seen on recent CT scan.       IMPRESSION:  54 year old male with clinically localized several intermediate risk prostate cancer initially diagnosed 1/2022 upon prostate biopsy for elevated PSA.    Status post robotic radical prostatectomy with pelvic lymph node dissection on 5/20/2022 for definitive local therapy.    Final pathology revealed grade group 2 prostate cancer, confined to the prostate, involving 20-25% of the prostatic parenchyma.   Extensive intraductal carcinoma present. + PNI.  No MARQUITA, SVI, or LVI.  Focal positive surgical margin (right posterior).  4 lymph nodes negative for malignancy.  Final pathologic stage pT2 N0 R1 (stage IIb).    Patient doing well.  Excellent recovery of urinary continence.    Patient not sexually active and not interested in management of ED after discussing with him.    PSA levels became detectable 1/2024, yet very low at 0.04 ng/mL.     PSA levels are being monitored.  His recent PSA level was reviewed and discussed with him.  Slight increase.  Doubling time not established yet since his levels became detectable in January 2024.  Thus, PSA kinetics somewhat reassuring so far.    Options discussed including continued monitoring with setting a PSA threshold of 0.1 ng/mL before considering restaging imaging and discussing salvage therapy.  Other option would be to consider early salvage RT at this level.    Patient agrees to continued monitoring of his PSA levels with repeat in 3 months.    All questions answered.      PLAN:  1.  Continue to closely monitor PSA levels with repeat in 3 months.    RTC for follow-up in 3 months with a PSA level prior to office visit.      Rafat Card MD  7/29/2024

## 2024-10-25 ENCOUNTER — LAB ENCOUNTER (OUTPATIENT)
Dept: LAB | Facility: HOSPITAL | Age: 54
End: 2024-10-25
Attending: UROLOGY
Payer: COMMERCIAL

## 2024-10-25 DIAGNOSIS — C61 PROSTATE CANCER (HCC): ICD-10-CM

## 2024-10-25 DIAGNOSIS — R97.21 RISING PSA FOLLOWING TREATMENT FOR MALIGNANT NEOPLASM OF PROSTATE: ICD-10-CM

## 2024-10-25 LAB — PSA SERPL-MCNC: <0.04 NG/ML (ref ?–4)

## 2024-10-25 PROCEDURE — 36415 COLL VENOUS BLD VENIPUNCTURE: CPT

## 2024-10-25 PROCEDURE — 84153 ASSAY OF PSA TOTAL: CPT

## 2024-10-28 ENCOUNTER — OFFICE VISIT (OUTPATIENT)
Dept: SURGERY | Facility: CLINIC | Age: 54
End: 2024-10-28
Payer: COMMERCIAL

## 2024-10-28 DIAGNOSIS — C61 PROSTATE CANCER (HCC): Primary | ICD-10-CM

## 2024-10-28 DIAGNOSIS — N52.31 ERECTILE DYSFUNCTION AFTER RADICAL PROSTATECTOMY: ICD-10-CM

## 2024-10-28 PROCEDURE — 99213 OFFICE O/P EST LOW 20 MIN: CPT | Performed by: UROLOGY

## 2024-10-28 NOTE — PROGRESS NOTES
[facility]  Follow-Up Visit    HPI: Yuri Mckeon is a 54 year old male presents for a follow up visit. Patient was last seen on 7/29/2024.  Here by himself.    INTERVAL HISTORY: History of clinically localized favorable intermediate risk prostate cancer initially diagnosed January 2022 upon prostate biopsy.    Patient underwent a robotic assisted radical prostatectomy with pelvic lymph node dissection on 5/20/2022.    Final pathology revealed grade group 2 prostate cancer, confined to the prostate, involving 20-25% of the prostatic parenchyma.  Extensive intraductal carcinoma present. + PNI.  No MARQUITA, SVI, or LVI.  Focal positive surgical margin (right posterior).  4 lymph nodes negative for malignancy.  Final pathologic stage pT2 N0 R1.    Post prostatectomy PSA levels have been undetectable up until recent PSA from 1/6/2024 which became detectable at 0.04 ng/mL.    Recent PSA 10/25/2024 slightly decreased to <0.04 ng/mL.    Excellent recovery of urinary control following surgery.  Uses 1 pad every other day.  No leakage at night.  Previously referred to pelvic floor PT however patient did not schedule.    He is not sexually active.  Previously provided a prescription for Viagra however patient did not try.    Otherwise doing well.  No fevers or chills, chest pain or shortness of breath, nausea or vomiting.  Bowel habits are back to normal.     No gross hematuria or dysuria.  Excellent urinary stream.      1. Clinically Localized Favorable Intermediate Risk Prostate Cancer (pT2 pN0 R1 cM0)  Patient reports family history of prostate cancer in his father and paternal uncle.  Both are alive and doing well from a prostate cancer standpoint.  Both underwent radical prostatectomy for definitive local therapy.     Patient had an elevated screening PSA level up to 9.47 ng/mL from 10/2021.  He was seen by Dr. Young who performed a TRUS-guided prostate biopsy on 1/21/2022 which revealed:  - Prostate volume on  TRUS: 30 g.  - Grade group 2 (Gillian 3+4=7) PCa in 5/12 cores (RB, LM, LA), up to 20% involvement, no PNI.  - ASAP in 1/12 cores, right mid.     Patient completed a staging nuclear medicine bone scan and CT abdomen and pelvis which did not reveal any evidence of metastatic disease.     Incidentally on his CT scan he was found to have a 4 mm obstructing proximal left ureteral stone with mild hydroureteronephrosis.  Findings consistent with a chronic bladder outlet obstruction including diffuse bladder wall thickening/trabeculation with a small right lateral bladder wall diverticulum.     Patient completely asymptomatic from a kidney stone standpoint.  No previous history of nephrolithiasis.     He had a follow-up KUB film on 3/1/2022 which revealed that his left ureteral stone has migrated distally near the UVJ.  He again denies any flank pain or associated symptoms.  He has not noticed passing the stone.     Patient currently not sexually active however he denies any difficulty obtaining and maintaining erections.     His IPSS score is 0 with a quality-of-life score of 1.       No gross hematuria or dysuria, fevers or chills, chest pain or shortness of breath, nausea or vomiting.    - 5/20/22: Patient underwent a robotic assisted radical prostatectomy with pelvic lymph node dissection.    Final pathology revealed grade group 2 prostate cancer, confined to the prostate, involving 20-25% of the prostatic parenchyma.  Extensive intraductal carcinoma present. + PNI.  No MARQUITA, SVI, or LVI.  Focal positive surgical margin (right posterior).  4 lymph nodes negative for malignancy.  Final pathologic stage pT2 N0 R1 (stage IIb).    - 7/2022 F/U: PSA <0.01. Down to 1 depend daily.  Dry at night.  No erections.  Referral for pelvic floor PT.  Start rehab with Viagra 25 to 50 mg twice weekly.  Follow-up in 6 months.    - 1/2023 F/U: PSA undetectable. 1 depend daily.  Dry at night.  No erections.  Did not try Viagra.      -  7/2023 F/U: PSA undetectable. 1 depend daily.  Dry at night.  No erections.  Did not try Viagra.      - 1/2024 F/U: PSA detectable at 0.04 ng/mL.  1 depends daily.  Dry at night.  No erections.  Not interested in further treatment of ED.    - 4/2024 F/U: PSA 0.05 ng/mL.  1 depend daily.  Dry at night.  No erections.  Not interested in further treatment of ED.    - 7/2024 F/U: PSA 0.06 ng/mL.  1 pad every other day.  Dry at night.  No erections.  Not interested in further treatment of ED.    - 10/2024 F/U: PSA <0.04 ng/mL. 1 depend per day. Dry at night.  No erections.  Not interested in further treatment of ED.       PAST MEDICAL HISTORY: HTN, history of pneumonia, prostate cancer 1/2022.     PAST SURGICAL HISTORY: pilonidal cyst surgery 2006, robotic radical prostatectomy 5/2022..     SOCIAL HISTORY:  and has 3 children. Active smoker of 1 pack/day for 25 years.  He drinks alcohol socially.  He works as a  with [employer].      Reviewed past medical, surgical, family, and social history.  Reviewed med list and allergies.      REVIEW OF SYSTEMS:  Pertinent positives and negatives per HPI. A 10-point ROS was performed and is otherwise negative.       EXAM:  There were no vitals taken for this visit.     Physical Exam  Constitutional:       Appearance: He is well-developed.   HENT:      Head: Normocephalic.   Eyes:      General: No scleral icterus.  Cardiovascular:      Rate and Rhythm: Normal rate.   Pulmonary:      Effort: Pulmonary effort is normal.   Skin:     General: Skin is warm and dry.   Neurological:      Mental Status: He is alert and oriented to person, place, and time.   Psychiatric:         Mood and Affect: Mood normal.         Behavior: Behavior normal.       PATHOLOGY:    Pathology                                Case: [id number]    Provider:  Rafat Card MD       Collected:           05/20/2022     A. Periprostatic fat:  Benign adipose tissue with no evidence of  involvement by adenocarcinoma.     B. Left pelvic lymph node:  Three lymph nodes negative for metastatic carcinoma (0/3).     C. Right pelvic lymph node:  One lymph node negative for metastatic carcinoma (0/1).     D. Prostate and bilateral seminal vesicles; laparoscopic radical prostatectomy:   Prostatic adenocarcinoma, acinar type, Gillian grade 7 (3+4), grade group 2, confined to the prostate, involving approximately 20-25% of the prostatic parenchyma examined.  Intraductal carcinoma present, extensive.  Perineural invasion is identified.  Bilateral vas deferens and seminal vesicles with no evidence of involvement by carcinoma.  Carcinoma focally involves the inked posterior right margin.   pT2 N0.  See comment.      LABS:    Component PSA   Latest Ref Rng & Units <=4.00 ng/mL   1025/2024 <0.04   7/27/2024 0.06    4/20/2024 0.05   1/6/2024 0.04   7/8/2023 <0.01   1/9/2023 <0.01   7/6/2022 <0.01   10/16/2021 9.47 (H)   8/7/2021 6.49 (H)   4/27/2019 3.75   1/9/2018 2.5   2/2/2017 2.3   11/2/2015 2.1       IMAGING:    CT A/P (2/17/2022): 1. There is a history of recently diagnosed prostate adenocarcinoma.  No intra-abdominal lymphadenopathy/metastases.  2. Obstructing 4 mm stone in the proximal left ureter resulting in mild hydroureteronephrosis.  No perinephric fluid collection.  3. Findings consistent with a chronic bladder outlet obstruction including diffuse bladder wall thickening/trabeculation with a small right lateral bladder wall diverticulum.  4. Lesser incidental findings as above.     NM BS (2/18/2022): 1. No evidence of metastatic disease.  2. Left hydroureteronephrosis related to a ureteral calculus seen on recent CT scan.       IMPRESSION:  54 year old male with clinically localized several intermediate risk prostate cancer initially diagnosed 1/2022 upon prostate biopsy for elevated PSA.    Status post robotic radical prostatectomy with pelvic lymph node dissection on 5/20/2022 for definitive local  therapy.    Final pathology revealed grade group 2 prostate cancer, confined to the prostate, involving 20-25% of the prostatic parenchyma.  Extensive intraductal carcinoma present. + PNI.  No MARQUITA, SVI, or LVI.  Focal positive surgical margin (right posterior).  4 lymph nodes negative for malignancy.  Final pathologic stage pT2 N0 R1 (stage IIb).    Patient doing well.  Excellent recovery of urinary continence.    Patient not sexually active and not interested in management of ED after discussing with him.    PSA levels became detectable 1/2024, yet very low at 0.04 ng/mL.     PSA levels are being monitored.  His recent PSA level was reviewed and discussed with him.  He had a slight transient rise in PSA levels but recently went back down to undetectable levels.  PSA kinetics have been overall reassuring so far.    Recommend continued PSA monitoring with repeat in 6 months.    Patient agrees.    All questions answered.      PLAN:  1.  Continue to closely monitor PSA levels with repeat in 3 months.    RTC for follow-up in 6 months with a PSA level prior to office visit.      Rafat Card MD  10/28/2024

## 2025-03-28 DIAGNOSIS — I10 ESSENTIAL HYPERTENSION: ICD-10-CM

## 2025-04-01 RX ORDER — LOSARTAN POTASSIUM 100 MG/1
100 TABLET ORAL DAILY
Qty: 90 TABLET | Refills: 3 | Status: SHIPPED | OUTPATIENT
Start: 2025-04-01

## 2025-04-01 NOTE — TELEPHONE ENCOUNTER
Please review. Protocol Failed; No Protocol    Future Appointments   Date Time Provider Department Center   4/28/2025 10:40 AM Rafat Card MD [facility]     [facility] message sent to patient to schedule an office visit with Provider and/or  Routed to Patient  for assistance with appointment.

## 2025-04-03 NOTE — TELEPHONE ENCOUNTER
2nd attempt - called patient; mailbox is Full and cannot accept any messages; please schedule patient per below if call is returned

## 2025-04-19 DIAGNOSIS — I10 ESSENTIAL HYPERTENSION: ICD-10-CM

## 2025-04-23 RX ORDER — AMLODIPINE BESYLATE 5 MG/1
5 TABLET ORAL DAILY
Qty: 90 TABLET | Refills: 0 | Status: SHIPPED | OUTPATIENT
Start: 2025-04-23

## 2025-04-23 NOTE — TELEPHONE ENCOUNTER
Please review.  Protocol failed/has no protocol    Last Office Visit: 03/16/2024  [facility] message sent to patient to schedule an office visit with Primary Care Provider.   Will also route to Call Center to make a phone attempt.    Requested Prescriptions     Pending Prescriptions Disp Refills    AMLODIPINE 5 MG Oral Tab [Pharmacy Med Name: AMLODIPINE BESYLATE 5 MG TAB] 90 tablet 3     Sig: TAKE 1 TABLET (5 MG TOTAL) BY MOUTH DAILY.

## 2025-04-26 ENCOUNTER — LAB ENCOUNTER (OUTPATIENT)
Dept: LAB | Facility: HOSPITAL | Age: 55
End: 2025-04-26
Attending: UROLOGY
Payer: COMMERCIAL

## 2025-04-26 DIAGNOSIS — C61 PROSTATE CANCER (HCC): ICD-10-CM

## 2025-04-26 LAB — PSA SERPL-MCNC: 0.05 NG/ML (ref ?–4)

## 2025-04-26 PROCEDURE — 36415 COLL VENOUS BLD VENIPUNCTURE: CPT

## 2025-04-26 PROCEDURE — 84153 ASSAY OF PSA TOTAL: CPT

## 2025-04-28 ENCOUNTER — OFFICE VISIT (OUTPATIENT)
Dept: SURGERY | Facility: CLINIC | Age: 55
End: 2025-04-28
Payer: COMMERCIAL

## 2025-04-28 DIAGNOSIS — C61 PROSTATE CANCER (HCC): Primary | ICD-10-CM

## 2025-04-28 DIAGNOSIS — N39.3 MALE STRESS INCONTINENCE: ICD-10-CM

## 2025-04-28 DIAGNOSIS — N52.31 ERECTILE DYSFUNCTION AFTER RADICAL PROSTATECTOMY: ICD-10-CM

## 2025-04-28 PROCEDURE — 99213 OFFICE O/P EST LOW 20 MIN: CPT | Performed by: UROLOGY

## 2025-04-28 NOTE — PROGRESS NOTES
[facility] Urology  Follow-Up Visit    HPI: Yuri Mckeon is a 54 year old male presents for a follow up visit. Patient was last seen on 10/28/2024.  Here by himself.    INTERVAL HISTORY: History of clinically localized favorable intermediate risk prostate cancer initially diagnosed January 2022 upon prostate biopsy.    Patient underwent a robotic assisted radical prostatectomy with pelvic lymph node dissection on 5/20/2022.    Final pathology revealed grade group 2 prostate cancer, confined to the prostate, involving 20-25% of the prostatic parenchyma.  Extensive intraductal carcinoma present. + PNI.  No MARQUITA, SVI, or LVI.  Focal positive surgical margin (right posterior).  4 lymph nodes negative for malignancy.  Final pathologic stage pT2 N0 R1.    Post prostatectomy PSA levels have been undetectable up until 1/2024 where his PSA became detectable at 0.04 ng/mL.    Since then, levels have been mildly fluctuating yet largely stable.  Recent PSA 4/2025 was 0.05 ng/mL.    Excellent recovery of urinary control following surgery.  Uses 1-2 safety pads per day. No leakage at night.  Previously referred to pelvic floor PT however patient did not schedule.    He is not sexually active.  Previously provided a prescription for Viagra however patient did not try.    Otherwise doing well.  No fevers or chills, chest pain or shortness of breath, nausea or vomiting.  Bowel habits are back to normal.     No gross hematuria or dysuria.  Excellent urinary stream.      1. Clinically Localized Favorable Intermediate Risk Prostate Cancer (pT2 pN0 R1 cM0)  Patient reports family history of prostate cancer in his father and paternal uncle.  Both are alive and doing well from a prostate cancer standpoint.  Both underwent radical prostatectomy for definitive local therapy.     Patient had an elevated screening PSA level up to 9.47 ng/mL from 10/2021.  He was seen by Dr. Young who performed a TRUS-guided prostate biopsy on 1/21/2022  which revealed:  - Prostate volume on TRUS: 30 g.  - Grade group 2 (Gillian 3+4=7) PCa in 5/12 cores (RB, LM, LA), up to 20% involvement, no PNI.  - ASAP in 1/12 cores, right mid.     Patient completed a staging nuclear medicine bone scan and CT abdomen and pelvis which did not reveal any evidence of metastatic disease.     Incidentally on his CT scan he was found to have a 4 mm obstructing proximal left ureteral stone with mild hydroureteronephrosis.  Findings consistent with a chronic bladder outlet obstruction including diffuse bladder wall thickening/trabeculation with a small right lateral bladder wall diverticulum.     Patient completely asymptomatic from a kidney stone standpoint.  No previous history of nephrolithiasis.     He had a follow-up KUB film on 3/1/2022 which revealed that his left ureteral stone has migrated distally near the UVJ.  He again denies any flank pain or associated symptoms.  He has not noticed passing the stone.     Patient currently not sexually active however he denies any difficulty obtaining and maintaining erections.     His IPSS score is 0 with a quality-of-life score of 1.       No gross hematuria or dysuria, fevers or chills, chest pain or shortness of breath, nausea or vomiting.    - 5/20/22: Patient underwent a robotic assisted radical prostatectomy with pelvic lymph node dissection.    Final pathology revealed grade group 2 prostate cancer, confined to the prostate, involving 20-25% of the prostatic parenchyma.  Extensive intraductal carcinoma present. + PNI.  No MARQUITA, SVI, or LVI.  Focal positive surgical margin (right posterior).  4 lymph nodes negative for malignancy.  Final pathologic stage pT2 N0 R1 (stage IIb).    - 7/2022 F/U: PSA <0.01. Down to 1 depend daily.  Dry at night.  No erections.  Referral for pelvic floor PT.  Start rehab with Viagra 25 to 50 mg twice weekly.  Follow-up in 6 months.    - 1/2023 F/U: PSA undetectable. 1 depend daily.  Dry at night.  No  erections.  Did not try Viagra.      - 7/2023 F/U: PSA undetectable. 1 depend daily.  Dry at night.  No erections.  Did not try Viagra.      - 1/2024 F/U: PSA detectable at 0.04 ng/mL.  1 depends daily.  Dry at night.  No erections.  Not interested in further treatment of ED.    - 4/2024 F/U: PSA 0.05 ng/mL.  1 depend daily.  Dry at night.  No erections.  Not interested in further treatment of ED.    - 7/2024 F/U: PSA 0.06 ng/mL.  1 pad every other day.  Dry at night.  No erections.  Not interested in further treatment of ED.    - 10/2024 F/U: PSA <0.04 ng/mL. 1 depend per day. Dry at night.  No erections.  Not interested in further treatment of ED.    - 4/2025 F/U: PSA 0.05 ng/mL.  Went to safety pads daily.. Dry at night.  No erections.  Not interested in further treatment of ED.       PAST MEDICAL HISTORY: HTN, history of pneumonia, prostate cancer 1/2022.     PAST SURGICAL HISTORY: pilonidal cyst surgery 2006, robotic radical prostatectomy 5/2022..     SOCIAL HISTORY:  and has 3 children. Active smoker of 1 pack/day for 25 years.  He drinks alcohol socially.  He works as a  with [employer].      Reviewed past medical, surgical, family, and social history.  Reviewed med list and allergies.      REVIEW OF SYSTEMS:  Pertinent positives and negatives per HPI. A 10-point ROS was performed and is otherwise negative.       EXAM:  There were no vitals taken for this visit.     Physical Exam  Constitutional:       Appearance: He is well-developed.   HENT:      Head: Normocephalic.   Eyes:      General: No scleral icterus.  Cardiovascular:      Rate and Rhythm: Normal rate.   Pulmonary:      Effort: Pulmonary effort is normal.   Skin:     General: Skin is warm and dry.   Neurological:      Mental Status: He is alert and oriented to person, place, and time.   Psychiatric:         Mood and Affect: Mood normal.         Behavior: Behavior normal.       PATHOLOGY:    Pathology                                 Case: [id number]    Provider:  Rafat Card MD       Collected:           05/20/2022     A. Periprostatic fat:  Benign adipose tissue with no evidence of involvement by adenocarcinoma.     B. Left pelvic lymph node:  Three lymph nodes negative for metastatic carcinoma (0/3).     C. Right pelvic lymph node:  One lymph node negative for metastatic carcinoma (0/1).     D. Prostate and bilateral seminal vesicles; laparoscopic radical prostatectomy:   Prostatic adenocarcinoma, acinar type, [place] grade 7 (3+4), grade group 2, confined to the prostate, involving approximately 20-25% of the prostatic parenchyma examined.  Intraductal carcinoma present, extensive.  Perineural invasion is identified.  Bilateral vas deferens and seminal vesicles with no evidence of involvement by carcinoma.  Carcinoma focally involves the inked posterior right margin.   pT2 N0.  See comment.      LABS:    Component PSA   Latest Ref Rng & Units <=4.00 ng/mL   4/26/2025 0.05   10/25/2024 <0.04   7/27/2024 0.06    4/20/2024 0.05   1/6/2024 0.04   7/8/2023 <0.01   1/9/2023 <0.01   7/6/2022 <0.01   10/16/2021 9.47 (H)   8/7/2021 6.49 (H)   4/27/2019 3.75   1/9/2018 2.5   2/2/2017 2.3   11/2/2015 2.1       IMAGING:    CT A/P (2/17/2022): 1. There is a history of recently diagnosed prostate adenocarcinoma.  No intra-abdominal lymphadenopathy/metastases.  2. Obstructing 4 mm stone in the proximal left ureter resulting in mild hydroureteronephrosis.  No perinephric fluid collection.  3. Findings consistent with a chronic bladder outlet obstruction including diffuse bladder wall thickening/trabeculation with a small right lateral bladder wall diverticulum.  4. Lesser incidental findings as above.     NM BS (2/18/2022): 1. No evidence of metastatic disease.  2. Left hydroureteronephrosis related to a ureteral calculus seen on recent CT scan.       IMPRESSION:  54 year old male with clinically localized several intermediate risk  prostate cancer initially diagnosed 1/2022 upon prostate biopsy for elevated PSA.    Status post robotic radical prostatectomy with pelvic lymph node dissection on 5/20/2022 for definitive local therapy.    Final pathology revealed grade group 2 prostate cancer, confined to the prostate, involving 20-25% of the prostatic parenchyma.  Extensive intraductal carcinoma present. + PNI.  No MARQUITA, SVI, or LVI.  Focal positive surgical margin (right posterior).  4 lymph nodes negative for malignancy.  Final pathologic stage pT2 N0 R1 (stage IIb).    Patient doing well.  Excellent recovery of urinary continence.    Patient not sexually active and not interested in management of ED after discussing with him.    PSA levels became detectable 1/2024, yet very low at 0.04 ng/mL.     PSA levels are being monitored.  His recent PSA level was reviewed and discussed with him. PSA kinetics have been overall reassuring so far.  He has not reached a doubling time yet and his PSA levels remain at less than 0.1 ng/mL.    Recommend continued PSA monitoring with repeat in 6 months.    Patient agrees.    All questions answered.      PLAN:  1.  Continue to monitor PSA levels with repeat in 6 months.    RTC for follow-up in 6 months with a PSA level prior to office visit.      Rafat Card MD  4/28/2025

## 2025-07-02 ENCOUNTER — TELEPHONE (OUTPATIENT)
Dept: INTERNAL MEDICINE CLINIC | Facility: CLINIC | Age: 55
End: 2025-07-02

## 2025-07-02 NOTE — TELEPHONE ENCOUNTER
Patient asking for a refill on:    losartan 100 MG Oral Tab       [facility]/pharmacy #[id number] - [place], [place] - [address] AT [facility], [phone], [phone]        Patient states he has a couple of pills left.

## 2025-07-02 NOTE — TELEPHONE ENCOUNTER
losartan 100 MG Oral Tab 90 tablet 3 4/1/2025 --    Sig - Route: Take 1 tablet (100 mg total) by mouth daily. - Oral    Sent to pharmacy as: Losartan Potassium 100 MG Oral Tablet (Cozaar)    E-Prescribing Status: Receipt confirmed by pharmacy (4/1/2025 10:38 AM CDT)      Associated Diagnoses    Essential hypertension        Pharmacy    [facility]/PHARMACY #[id number] - [place], [place] - [address] AT [facility], [phone], [phone]

## 2025-07-18 DIAGNOSIS — I10 ESSENTIAL HYPERTENSION: ICD-10-CM

## 2025-07-21 RX ORDER — AMLODIPINE BESYLATE 5 MG/1
5 TABLET ORAL DAILY
Qty: 90 TABLET | Refills: 0 | Status: SHIPPED | OUTPATIENT
Start: 2025-07-21

## 2025-08-01 ENCOUNTER — OFFICE VISIT (OUTPATIENT)
Dept: INTERNAL MEDICINE CLINIC | Facility: CLINIC | Age: 55
End: 2025-08-01

## 2025-08-01 ENCOUNTER — TELEPHONE (OUTPATIENT)
Dept: GASTROENTEROLOGY | Facility: CLINIC | Age: 55
End: 2025-08-01

## 2025-08-01 VITALS
DIASTOLIC BLOOD PRESSURE: 80 MMHG | HEART RATE: 76 BPM | BODY MASS INDEX: 26.95 KG/M2 | HEIGHT: 72 IN | TEMPERATURE: 98 F | RESPIRATION RATE: 16 BRPM | WEIGHT: 199 LBS | OXYGEN SATURATION: 99 % | SYSTOLIC BLOOD PRESSURE: 136 MMHG

## 2025-08-01 DIAGNOSIS — F17.200 SMOKER: ICD-10-CM

## 2025-08-01 DIAGNOSIS — Z00.00 ANNUAL PHYSICAL EXAM: Primary | ICD-10-CM

## 2025-08-01 DIAGNOSIS — Z71.6 ENCOUNTER FOR SMOKING CESSATION COUNSELING: ICD-10-CM

## 2025-08-01 DIAGNOSIS — I10 ESSENTIAL HYPERTENSION: ICD-10-CM

## 2025-08-01 DIAGNOSIS — Z00.00 PREVENTATIVE HEALTH CARE: ICD-10-CM

## 2025-08-01 PROCEDURE — 99396 PREV VISIT EST AGE 40-64: CPT | Performed by: INTERNAL MEDICINE

## 2025-08-01 RX ORDER — AMLODIPINE BESYLATE 5 MG/1
5 TABLET ORAL DAILY
Qty: 90 TABLET | Refills: 1 | Status: SHIPPED | OUTPATIENT
Start: 2025-08-01

## 2025-08-01 RX ORDER — LOSARTAN POTASSIUM 100 MG/1
100 TABLET ORAL DAILY
Qty: 90 TABLET | Refills: 1 | Status: SHIPPED | OUTPATIENT
Start: 2025-08-01

## 2025-08-02 ENCOUNTER — LAB ENCOUNTER (OUTPATIENT)
Dept: LAB | Facility: HOSPITAL | Age: 55
End: 2025-08-02
Attending: INTERNAL MEDICINE

## 2025-08-02 DIAGNOSIS — Z00.00 ANNUAL PHYSICAL EXAM: ICD-10-CM

## 2025-08-02 DIAGNOSIS — F17.200 SMOKER: ICD-10-CM

## 2025-08-02 DIAGNOSIS — Z00.00 PREVENTATIVE HEALTH CARE: ICD-10-CM

## 2025-08-02 DIAGNOSIS — Z71.6 ENCOUNTER FOR SMOKING CESSATION COUNSELING: ICD-10-CM

## 2025-08-02 DIAGNOSIS — I10 ESSENTIAL HYPERTENSION: ICD-10-CM

## 2025-08-02 LAB
ALBUMIN SERPL-MCNC: 4.6 G/DL (ref 3.2–4.8)
ALBUMIN/GLOB SERPL: 2 (ref 1–2)
ALP LIVER SERPL-CCNC: 76 U/L (ref 45–117)
ALT SERPL-CCNC: 15 U/L (ref 10–49)
ANION GAP SERPL CALC-SCNC: 6 MMOL/L (ref 0–18)
AST SERPL-CCNC: 24 U/L (ref ?–34)
BASOPHILS # BLD AUTO: 0.08 X10(3) UL (ref 0–0.2)
BASOPHILS NFR BLD AUTO: 1.1 %
BILIRUB SERPL-MCNC: 0.5 MG/DL (ref 0.3–1.2)
BILIRUB UR QL: NEGATIVE
BUN BLD-MCNC: 11 MG/DL (ref 9–23)
BUN/CREAT SERPL: 12.5 (ref 10–20)
CALCIUM BLD-MCNC: 9.1 MG/DL (ref 8.7–10.4)
CHLORIDE SERPL-SCNC: 109 MMOL/L (ref 98–112)
CHOLEST SERPL-MCNC: 185 MG/DL (ref ?–200)
CLARITY UR: CLEAR
CO2 SERPL-SCNC: 27 MMOL/L (ref 21–32)
COLOR UR: YELLOW
COMPLEXED PSA SERPL-MCNC: <0.04 NG/ML (ref ?–4)
CREAT BLD-MCNC: 0.88 MG/DL (ref 0.7–1.3)
DEPRECATED RDW RBC AUTO: 47.5 FL (ref 35.1–46.3)
EGFRCR SERPLBLD CKD-EPI 2021: 102 ML/MIN/1.73M2 (ref 60–?)
EOSINOPHIL # BLD AUTO: 0.34 X10(3) UL (ref 0–0.7)
EOSINOPHIL NFR BLD AUTO: 4.7 %
ERYTHROCYTE [DISTWIDTH] IN BLOOD BY AUTOMATED COUNT: 13.2 % (ref 11–15)
FASTING PATIENT LIPID ANSWER: YES
FASTING STATUS PATIENT QL REPORTED: YES
GLOBULIN PLAS-MCNC: 2.3 G/DL (ref 2–3.5)
GLUCOSE BLD-MCNC: 95 MG/DL (ref 70–99)
GLUCOSE UR-MCNC: NORMAL MG/DL
HCT VFR BLD AUTO: 47.1 % (ref 39–53)
HDLC SERPL-MCNC: 52 MG/DL (ref 40–59)
HGB BLD-MCNC: 16.4 G/DL (ref 13–17.5)
HGB UR QL STRIP.AUTO: NEGATIVE
IMM GRANULOCYTES # BLD AUTO: 0.03 X10(3) UL (ref 0–1)
IMM GRANULOCYTES NFR BLD: 0.4 %
KETONES UR-MCNC: NEGATIVE MG/DL
LDLC SERPL CALC-MCNC: 120 MG/DL (ref ?–100)
LEUKOCYTE ESTERASE UR QL STRIP.AUTO: NEGATIVE
LYMPHOCYTES # BLD AUTO: 2.38 X10(3) UL (ref 1–4)
LYMPHOCYTES NFR BLD AUTO: 32.6 %
MCH RBC QN AUTO: 33.8 PG (ref 26–34)
MCHC RBC AUTO-ENTMCNC: 34.8 G/DL (ref 31–37)
MCV RBC AUTO: 97.1 FL (ref 80–100)
MONOCYTES # BLD AUTO: 0.88 X10(3) UL (ref 0.1–1)
MONOCYTES NFR BLD AUTO: 12.1 %
NEUTROPHILS # BLD AUTO: 3.59 X10 (3) UL (ref 1.5–7.7)
NEUTROPHILS # BLD AUTO: 3.59 X10(3) UL (ref 1.5–7.7)
NEUTROPHILS NFR BLD AUTO: 49.1 %
NITRITE UR QL STRIP.AUTO: NEGATIVE
NONHDLC SERPL-MCNC: 133 MG/DL (ref ?–130)
OSMOLALITY SERPL CALC.SUM OF ELEC: 293 MOSM/KG (ref 275–295)
PH UR: 6 (ref 5–8)
PLATELET # BLD AUTO: 330 10(3)UL (ref 150–450)
POTASSIUM SERPL-SCNC: 4 MMOL/L (ref 3.5–5.1)
PROT SERPL-MCNC: 6.9 G/DL (ref 5.7–8.2)
PROT UR-MCNC: NEGATIVE MG/DL
RBC # BLD AUTO: 4.85 X10(6)UL (ref 4.3–5.7)
SODIUM SERPL-SCNC: 142 MMOL/L (ref 136–145)
SP GR UR STRIP: 1.02 (ref 1–1.03)
TRIGL SERPL-MCNC: 69 MG/DL (ref 30–149)
TSI SER-ACNC: 0.67 UIU/ML (ref 0.55–4.78)
UROBILINOGEN UR STRIP-ACNC: NORMAL
VLDLC SERPL CALC-MCNC: 12 MG/DL (ref 0–30)
WBC # BLD AUTO: 7.3 X10(3) UL (ref 4–11)

## 2025-08-02 PROCEDURE — 84443 ASSAY THYROID STIM HORMONE: CPT

## 2025-08-02 PROCEDURE — 85025 COMPLETE CBC W/AUTO DIFF WBC: CPT

## 2025-08-02 PROCEDURE — 81003 URINALYSIS AUTO W/O SCOPE: CPT

## 2025-08-02 PROCEDURE — 80053 COMPREHEN METABOLIC PANEL: CPT

## 2025-08-02 PROCEDURE — 36415 COLL VENOUS BLD VENIPUNCTURE: CPT

## 2025-08-02 PROCEDURE — 80061 LIPID PANEL: CPT

## (undated) DIAGNOSIS — C61 PROSTATE CANCER (HCC): Primary | ICD-10-CM

## (undated) DIAGNOSIS — Z51.81 MONITORING FOR ANTICOAGULANT USE: ICD-10-CM

## (undated) DIAGNOSIS — Z01.818 PREOP EXAMINATION: Primary | ICD-10-CM

## (undated) DIAGNOSIS — R39.89 OTHER SYMPTOMS AND SIGNS INVOLVING THE GENITOURINARY SYSTEM: ICD-10-CM

## (undated) DIAGNOSIS — Z79.01 MONITORING FOR ANTICOAGULANT USE: ICD-10-CM

## (undated) DIAGNOSIS — C61 PROSTATE CANCER (HCC): ICD-10-CM

## (undated) DEVICE — CLIP HEMOLOK LARGE PURPLE

## (undated) DEVICE — SUTURE VLOC 180 3-0 6" 0604

## (undated) DEVICE — SUT VICRYL 0 J608H

## (undated) DEVICE — SUT MONOCRYL 4-0 PS-2 Y426H

## (undated) DEVICE — INSUFFLATION NEEDLE TO ESTABLISH PNEUMOPERITONEUM.: Brand: INSUFFLATION NEEDLE

## (undated) DEVICE — PROGRASP FORCEPS: Brand: ENDOWRIST

## (undated) DEVICE — STAPLER INTNL 26CMX4MM X

## (undated) DEVICE — WATER STERILE AQUALITE 1000ML

## (undated) DEVICE — BLADELESS OBTURATOR: Brand: WECK VISTA

## (undated) DEVICE — RELOAD STPLR 30MM EGIA 3-STPL

## (undated) DEVICE — SYSTEM SURGYPAD VELCRO 36IN

## (undated) DEVICE — 2HRM17 2-0 UMND 16X16 13MM LDR: Brand: 2HRM17 2-0 UMND 16X16 13MM LDR

## (undated) DEVICE — DEV CLOS VLOC 2/0 V 9 V-30

## (undated) DEVICE — SUT VICRYL 0 CT-1 J340H

## (undated) DEVICE — AIRSEAL 12 MM ACCESS PORT AND PALM GRIP OBTURATOR WITH BLADELESS OPTICAL TIP, 120 MM LENGTH: Brand: AIRSEAL

## (undated) DEVICE — GAMMEX® PI HYBRID SIZE 8.5, STERILE POWDER-FREE SURGICAL GLOVE, POLYISOPRENE AND NEOPRENE BLEND: Brand: GAMMEX

## (undated) DEVICE — CLOSURE EXOFIN 1.0ML

## (undated) DEVICE — MARYLAND BIPOLAR FORCEPS: Brand: ENDOWRIST

## (undated) DEVICE — CANNULA SEAL

## (undated) DEVICE — DRAPE SHEET LAPCHOLE 124X100X7

## (undated) DEVICE — 3M™ STERI-DRAPE™ PATIENT ISOLATION DRAPE 1014: Brand: STERI-DRAPE™

## (undated) DEVICE — DRESSING BIOPATCH 1X4 BLUE

## (undated) DEVICE — ABSORBABLE HEMOSTAT (OXIDIZED REGENERATED CELLULOSE, U.S.P.): Brand: SURGICEL

## (undated) DEVICE — TISSUE RETRIEVAL SYSTEM: Brand: INZII RETRIEVAL SYSTEM

## (undated) DEVICE — GAMMEX® PI HYBRID SIZE 7.5, STERILE POWDER-FREE SURGICAL GLOVE, POLYISOPRENE AND NEOPRENE BLEND: Brand: GAMMEX

## (undated) DEVICE — ELECTRO LUBE IS A SINGLE PATIENT USE DEVICE THAT IS INTENDED TO BE USED ON ELECTROSURGICAL ELECTRODES TO REDUCE STICKING.: Brand: KEY SURGICAL ELECTRO LUBE

## (undated) DEVICE — TIP COVER ACCESSORY

## (undated) DEVICE — PENCIL TELESCOPE MEGADYNE SE

## (undated) DEVICE — LAPAROVUE VISIBILITY SYSTEM LAPAROSCOPIC SOLUTIONS: Brand: LAPAROVUE

## (undated) DEVICE — ROBOTIC: Brand: MEDLINE INDUSTRIES, INC.

## (undated) DEVICE — TRI-LUMEN FILTERED TUBE SET WITH ACTIVATED CHARCOAL FILTER: Brand: AIRSEAL

## (undated) DEVICE — 3M™ TEGADERM™ TRANSPARENT FILM DRESSING, 1626W, 4 IN X 4-3/4 IN (10 CM X 12 CM), 50 EACH/CARTON, 4 CARTON/CASE: Brand: 3M™ TEGADERM™

## (undated) DEVICE — CATH BARDEX IC FOLEY 18FR 5CC

## (undated) DEVICE — COLUMN DRAPE

## (undated) DEVICE — ARM DRAPE

## (undated) DEVICE — SUT SILK 2-0 FS 685G

## (undated) DEVICE — SUT VICRYL 3-0 SH J416H

## (undated) DEVICE — TROCAR: Brand: KII FIOS FIRST ENTRY

## (undated) DEVICE — STERILE SURGICAL LUBRICANT, METAL TUBE: Brand: SURGILUBE

## (undated) DEVICE — BAG DRAIN INFECTION CNTRL 2000

## (undated) DEVICE — DALE FOLEY CATHETER HOLDER, LEGBAND, FITS UP TO 30": Brand: DALE FOLEY CATHETER HOLDER

## (undated) DEVICE — SOL  .9 1000ML BAG

## (undated) DEVICE — SUCTION CANISTER, 3000CC,SAFELINER: Brand: DEROYAL

## (undated) DEVICE — DRAPE SRG 26X15IN UTL TPE STRL

## (undated) DEVICE — FENESTRATED BIPOLAR FORCEPS: Brand: ENDOWRIST

## (undated) DEVICE — TRAY SKIN PREP PVP-1

## (undated) DEVICE — LARGE HEM-O-LOK CLIP APPLIER: Brand: ENDOWRIST;DAVINCI SI

## (undated) DEVICE — MONOPOLAR CURVED SCISSORS: Brand: ENDOWRIST

## (undated) DEVICE — LARGE NEEDLE DRIVER: Brand: ENDOWRIST

## (undated) DEVICE — Device

## (undated) NOTE — MR AVS SNAPSHOT
[address] [address]  [phone]               Thank you for choosing us for your health care visit with Dianna Stauffer MD.  We are glad to serve you and happy to provide you with this summary Where to Get Your Medications      These medications were sent to [address], [address] [facility]  [address] [facility], [phone], Rachel [address] [place] [id number]    Hours:  24-jez Water is best for hydration Fast food. Eat at home when possible     Tips for increasing your physical activity – Adults who are physically active are less likely to develop some chronic diseases than adults who are inactive.      HOW TO GET STARTED: HOW

## (undated) NOTE — LETTER
To Whom It May Concern:    Sha Verma has been under our care regarding ongoing medical issues. Because of this, he has been required to restrict his physical activities. He may resume his usual activities, including work, on 07/05/2022 with the following restrictions:    [x]  None     []    No heavy lifting (over 15 pounds) for               weeks   []    Part-time (no more than             hours per week) for               week   []  Other:        Please feel free to contact us if there are any questions.       Sincerely,      Marcella Calzada MD  [address] [place] [address]  [phone]        Document generated by:  Randal Gregorio RN